# Patient Record
Sex: FEMALE | Race: WHITE | NOT HISPANIC OR LATINO | Employment: FULL TIME | URBAN - METROPOLITAN AREA
[De-identification: names, ages, dates, MRNs, and addresses within clinical notes are randomized per-mention and may not be internally consistent; named-entity substitution may affect disease eponyms.]

---

## 2022-11-30 ENCOUNTER — OFFICE VISIT (OUTPATIENT)
Dept: PSYCHIATRY | Facility: CLINIC | Age: 27
End: 2022-11-30

## 2022-11-30 VITALS
WEIGHT: 173.4 LBS | DIASTOLIC BLOOD PRESSURE: 74 MMHG | HEART RATE: 69 BPM | HEIGHT: 63 IN | BODY MASS INDEX: 30.72 KG/M2 | SYSTOLIC BLOOD PRESSURE: 129 MMHG

## 2022-11-30 DIAGNOSIS — F41.1 GAD (GENERALIZED ANXIETY DISORDER): ICD-10-CM

## 2022-11-30 DIAGNOSIS — F32.81 PMDD (PREMENSTRUAL DYSPHORIC DISORDER): Primary | ICD-10-CM

## 2022-11-30 DIAGNOSIS — E28.2 PCOS (POLYCYSTIC OVARIAN SYNDROME): ICD-10-CM

## 2022-11-30 PROBLEM — Z67.91 RH NEGATIVE STATUS DURING PREGNANCY: Status: ACTIVE | Noted: 2020-09-13

## 2022-11-30 PROBLEM — O09.299 HX OF PREECLAMPSIA, PRIOR PREGNANCY, CURRENTLY PREGNANT: Status: ACTIVE | Noted: 2020-09-11

## 2022-11-30 PROBLEM — O99.340 DEPRESSION AFFECTING PREGNANCY: Status: ACTIVE | Noted: 2021-01-07

## 2022-11-30 PROBLEM — F32.A DEPRESSION AFFECTING PREGNANCY: Status: ACTIVE | Noted: 2021-01-07

## 2022-11-30 PROBLEM — O26.899 RH NEGATIVE STATUS DURING PREGNANCY: Status: ACTIVE | Noted: 2020-09-13

## 2022-11-30 PROBLEM — O34.219 PREVIOUS CESAREAN DELIVERY AFFECTING PREGNANCY: Status: ACTIVE | Noted: 2020-09-11

## 2022-11-30 PROCEDURE — 90792 PSYCH DIAG EVAL W/MED SRVCS: CPT | Performed by: PSYCHIATRY & NEUROLOGY

## 2022-11-30 RX ORDER — MULTIPLE VITAMINS W/ MINERALS TAB 9MG-400MCG
1 TAB ORAL DAILY
COMMUNITY

## 2022-11-30 RX ORDER — IBUPROFEN 800 MG/1
TABLET ORAL
COMMUNITY
End: 2022-11-30

## 2022-11-30 RX ORDER — AMOXICILLIN 875 MG/1
TABLET, COATED ORAL
COMMUNITY
Start: 2022-11-17 | End: 2022-11-30

## 2022-11-30 RX ORDER — BIOTIN 10 MG
TABLET ORAL
COMMUNITY
End: 2022-11-30

## 2022-11-30 RX ORDER — BUPROPION HYDROCHLORIDE 150 MG/1
150 TABLET ORAL EVERY MORNING
Qty: 30 TABLET | Refills: 0 | Status: SHIPPED | OUTPATIENT
Start: 2022-11-30 | End: 2022-12-28 | Stop reason: SDUPTHER

## 2022-11-30 RX ORDER — CETIRIZINE HYDROCHLORIDE 10 MG/1
10 TABLET ORAL DAILY
COMMUNITY
Start: 2022-11-16 | End: 2023-01-09

## 2022-11-30 RX ORDER — BACLOFEN 20 MG/1
TABLET ORAL
COMMUNITY
End: 2022-11-30

## 2022-11-30 RX ORDER — MAGNESIUM OXIDE 400 MG/1
400 TABLET ORAL DAILY
COMMUNITY

## 2022-11-30 RX ORDER — SPIRONOLACTONE 50 MG/1
50 TABLET, FILM COATED ORAL DAILY
COMMUNITY
Start: 2022-11-16

## 2022-11-30 RX ORDER — VITAMIN A ACETATE, BETA CAROTENE, ASCORBIC ACID, CHOLECALCIFEROL, .ALPHA.-TOCOPHEROL ACETATE, DL-, THIAMINE MONONITRATE, RIBOFLAVIN, NIACINAMIDE, PYRIDOXINE HYDROCHLORIDE, FOLIC ACID, CYANOCOBALAMIN, CALCIUM CARBONATE, FERROUS FUMARATE, ZINC OXIDE, CUPRIC OXIDE 3080; 12; 120; 400; 1; 1.84; 3; 20; 22; 920; 25; 200; 27; 10; 2 [IU]/1; UG/1; MG/1; [IU]/1; MG/1; MG/1; MG/1; MG/1; MG/1; [IU]/1; MG/1; MG/1; MG/1; MG/1; MG/1
1 TABLET, FILM COATED ORAL DAILY
COMMUNITY
End: 2022-11-30
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
## 2022-11-30 NOTE — TREATMENT PLAN
Multi-Disciplinary Problems (from Behavioral Health Treatment Plan)    Active Problems     Problem: Anxiety  Start Date: 11/30/22    Problem Details: The patient self-scales this problem as a 5 with 10 being the worst.        Goal Priority Start Date Expected End Date End Date    Patient will develop and implement behavioral and cognitive strategies to reduce anxiety and irrational fears. -- 11/30/22 -- --    Goal Details: Progress toward goal:  Not appropriate to rate progress toward goal since this is the initial treatment plan.        Goal Intervention Frequency Start Date End Date    Help patient explore past emotional issues in relation to present anxiety. PRN 11/30/22 --    Intervention Details: Duration of treatment until until remission of symptoms.        Goal Intervention Frequency Start Date End Date    Help patient develop an awareness of their cognitive and physical responses to anxiety. PRN 11/30/22 --    Intervention Details: Duration of treatment until until remission of symptoms.                           I have discussed and reviewed this treatment plan with the patient.

## 2022-12-28 DIAGNOSIS — F32.81 PMDD (PREMENSTRUAL DYSPHORIC DISORDER): ICD-10-CM

## 2022-12-28 RX ORDER — BUPROPION HYDROCHLORIDE 150 MG/1
150 TABLET ORAL EVERY MORNING
Qty: 30 TABLET | Refills: 0 | Status: SHIPPED | OUTPATIENT
Start: 2022-12-28 | End: 2023-01-09 | Stop reason: SDUPTHER

## 2022-12-28 NOTE — TELEPHONE ENCOUNTER
Pt called medication refill request for Wellbutrin XL 150mg and reported she only has about 2 days-worth left.     buPROPion XL (Wellbutrin XL) 150 MG 24 hr tablet (11/30/2022)    Pt has upcoming appt on 01/09/2023.     Medication order pended.

## 2023-01-09 ENCOUNTER — OFFICE VISIT (OUTPATIENT)
Dept: PSYCHIATRY | Facility: CLINIC | Age: 28
End: 2023-01-09
Payer: COMMERCIAL

## 2023-01-09 VITALS
DIASTOLIC BLOOD PRESSURE: 82 MMHG | HEIGHT: 63 IN | WEIGHT: 169 LBS | BODY MASS INDEX: 29.95 KG/M2 | SYSTOLIC BLOOD PRESSURE: 139 MMHG

## 2023-01-09 DIAGNOSIS — F41.1 GAD (GENERALIZED ANXIETY DISORDER): ICD-10-CM

## 2023-01-09 DIAGNOSIS — E28.2 PCOS (POLYCYSTIC OVARIAN SYNDROME): ICD-10-CM

## 2023-01-09 DIAGNOSIS — F32.81 PMDD (PREMENSTRUAL DYSPHORIC DISORDER): Primary | ICD-10-CM

## 2023-01-09 PROCEDURE — 99214 OFFICE O/P EST MOD 30 MIN: CPT | Performed by: PSYCHIATRY & NEUROLOGY

## 2023-01-09 RX ORDER — CLONAZEPAM 1 MG/1
1 TABLET ORAL DAILY PRN
Qty: 7 TABLET | Refills: 0 | Status: SHIPPED | OUTPATIENT
Start: 2023-01-09 | End: 2023-02-08

## 2023-01-09 RX ORDER — BUPROPION HYDROCHLORIDE 150 MG/1
150 TABLET ORAL EVERY MORNING
Qty: 30 TABLET | Refills: 0 | Status: SHIPPED | OUTPATIENT
Start: 2023-01-09 | End: 2023-02-08 | Stop reason: SDUPTHER

## 2023-01-09 NOTE — PROGRESS NOTES
[facility]  Follow-up Visit    Chief Complaint: PMDD    History of Present Illness: Luiza Oneill is a 27 y.o. female who presents today for follow-up regarding PMDD and MARA in the setting of PCOS. Last seen: 11/30 at which time bupropion was started. She presents accompanied by her sons in no acute distress and engages with me appropriately. Psychotropic regimen perceived to be effective. Side-effects per given history: denies.    Current treatment regimen includes:   - bupropion  mg QD    Today the patient feels that her mood overall is better. Bupropion has helped improve fatigue, mood, and intrusive thoughts related to premenstrual period. However, anxiety and agitation still remain high during premenstrual period. Thought process and content are devoid of overt aberration suggestive of acute raul/psychosis. The patient denies SI/HI/AVH. There are no overt changes on exam today compared to most recent evaluation.  - contextual changes: holidays, weather stressors - had to miss out on routine  - sleep: no change outside of diminished daytime somnolence  - appetite: tolerably diminished    I have counseled the patient with regard to diagnoses and the recommended treatment regimen as documented below: I will be prescribing rescue clonazepam for anxiety/agitation in the premenstrual period. Patient has been advised that long-term use of this agent can foster tachyphylaxis, dependence, and severe/life-threatening withdrawal with abrupt discontinuation - this agent will be used sparingly and as a rescue during periods of severe anxiety to augment more regular treatment options. I have specifically reviewed issues related to misuse and diversion with the patient today. Patient acknowledges the diagnoses per my rendered interpretation. Patient demonstrates understanding of potential risks/benefits/side effects associated with this regimen and is amenable to proceed in  this fashion.     Psychotherapy  - Time: 12 minutes  - interventions employed: the therapeutic alliance was strengthened to encourage the patient to express their thoughts and feelings freely. Esteem building was enhanced through praise, reassurance, normalizing/challenging, and encouragement as appropriate. Coping skills were enhanced to build distress tolerance skills and emotional regulation. Allowed patient to freely discuss issues without interruption or judgement with unconditional positive regard, active listening skills, and empathy. Provided a safe, confidential environment to facilitate the development of a positive therapeutic relationship and encourage open, honest communication. Assisted patient in processing session content; acknowledged and normalized/addressed, as appropriate, patient’s thoughts, feelings, and concerns by utilizing a person-centered approach in efforts to build appropriate rapport and a positive therapeutic relationship with open and honest communication.   - Diagnoses: see assessment and plan below  - Symptoms: see subjective above  - Goals   - patient: mitigate anxiety and irritability, sustain improvements to mood   - provider: challenge patterns of living conducive to pathology, strengthen defenses, promote problems solving, restore adaptive functioning and provide symptom relief.  - Treatment plan: continue supportive psychotherapy in subsequent appointments to provide symptom relief; see assessment and plan below for additional details:   - iteration: 1   - progress: partial   - (X)illumination, (X)contextualization, (X)detection, (working)development, (-)elaboration, (-)refinement  - functional status: fair  - mental status exam: as below  - prognosis: good    Psychiatric History:  - Pertinent interval changes: N/A  - Psychotropic medication trials: sertraline after her last pregnancy (helped with some anxiety, but felt estranged from herself, disconnected, had significant  sexual SE)  - Key synopsis: cutting as a teen, short-lived - \"more for attention\"; no hx SA    Substance Abuse History:   - Pertinent interval changes: N/A  - Key synopsis: opioids \"were a phase around 17\", quit smoking marijuana in     Social History:  - Pertinent interval changes: as above  - Key synopsis: lives in a house with her fiance and 4 children (1.4 YO, 3 YO, 4 YO, 5 YO); LPN; 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[employer]; [employer], occasionally goes to [employer]; HI[employer], yoga; recently decreased (was drinking a pot of coffee in the past)    Social History     Socioeconomic History   • Marital status: Single   Tobacco Use   • Smoking status: Former     Packs/day: 1.00     Years: 12.00     Pack years: 12.00     Types: Cigarettes     Quit date: 2022     Years since quittin.6   • Tobacco comments:     Quit cold turkey 6 months ago   Vaping Use   • Vaping Use: Former   • Substances: Nicotine, THC, Flavoring   Substance and Sexual Activity   • Alcohol use: Not Currently     Comment: socailly   • Drug use: Not Currently     Types: Marijuana, Oxycodone     Comment: On and off for years. Quit 6 months ago   • Sexual activity: Yes     Partners: Male     Birth control/protection: None, Tubal ligation     Tobacco use counseling/intervention: N/A, patient does not use tobacco; patient has been counseled with regard to risks of tobacco use.    PHQ-9 Depression Screening  PHQ-9 Total Score:      Little interest or pleasure in doing things?     Feeling down, depressed, or hopeless?     Trouble falling or staying asleep, or sleeping too much?     Feeling tired or having little energy?     Poor appetite or overeating?     Feeling bad about yourself - or that you are a failure or have let yourself or your family down?     Trouble concentrating on things, such as reading the newspaper or watching television?     Moving or speaking so slowly that other people could have noticed? Or the opposite - being so fidgety or restless that you have been  moving around a lot more than usual?     Thoughts that you would be better off dead, or of hurting yourself in some way?     PHQ-9 Total Score       Change in PHQ-9 since last measure: N/A (1)    MARA-7       Change in MARA-7 since last measure: N/A (11)    Problem List:  Patient Active Problem List   Diagnosis   • Depression affecting pregnancy   • Hx of preeclampsia, prior pregnancy, currently pregnant   • Previous  delivery affecting pregnancy   • Rh negative status during pregnancy   • PCOS (polycystic ovarian syndrome)   • MARA (generalized anxiety disorder)   • PMDD (premenstrual dysphoric disorder)     Allergy:   Allergies   Allergen Reactions   • Sulfa Antibiotics Other (See Comments)   • Sulfacetamide Sodium-Sulfur Hives        Discontinued Medications:  Medications Discontinued During This Encounter   Medication Reason   • cetirizine (zyrTEC) 10 MG tablet *Therapy completed   • Unable to find *Therapy completed   • buPROPion XL (Wellbutrin XL) 150 MG 24 hr tablet Reorder       Current Medications:   Current Outpatient Medications   Medication Sig Dispense Refill   • buPROPion XL (Wellbutrin XL) 150 MG 24 hr tablet Take 1 tablet by mouth Every Morning for 30 days. 30 tablet 0   • magnesium oxide (MAG-OX) 400 MG tablet Take 400 mg by mouth Daily.     • multivitamin with minerals tablet tablet Take 1 tablet by mouth Daily.     • Probiotic Product (ACIDOPHILUS PROBIOTIC BLEND PO) Acidophilus Probiotic Blend     • spironolactone (ALDACTONE) 50 MG tablet Take 50 mg by mouth Daily.     • clonazePAM (KlonoPIN) 1 MG tablet Take 1 tablet by mouth Daily As Needed for Anxiety (use during premenstrual period only, use sparingly, no early refills) for up to 7 doses. 7 tablet 0     No current facility-administered medications for this visit.     Past Medical History:  Past Medical History:   Diagnosis Date   • Anxiety    • Depression    • Depression affecting pregnancy 2021   • Panic disorder    • Self-injurious  behavior     As a teenager   • Substance abuse (HCC)      Past Surgical History:  Past Surgical History:   Procedure Laterality Date   • ABDOMINAL SURGERY      4x c sections   • POSTPARTUM TUBAL LIGATION         Mental Status Exam:   Appearance: well-groomed, sits upright, age-appropriate, normal habitus  Behavior: calm, cooperative, appropriate in demeanor, appropriate eye-contact  Mood/affect: euthymic / mood-congruent and appropriate in both range and amplitude  Speech: within expected variance; appropriate rate, appropriate rhythm, appropriate tone; non-pressured  Thought Process: linear, goal-directed; no FOI or ASTER; abstraction intact  Thought Content: coherent, devoid of overt delusions/perceptual disturbances  SI/HI: denies both SI and HI; exhibits future-orientation, self-advocates appropriately, no regular self-harm, no appreciable intent  Memory: no overt deficits  Orientation: oriented to person/place/time/situation  Concentration: appropriate during interview  Intellectual capacity: presumptively average  Insight: fair by given history/exam  Judgment: appropriate by given history/exam  Psychomotor: no appreciable latency/retardation/agitation/tremor  Gait: WNL    Review of Systems:  Review of Systems   Constitutional: Positive for appetite change. Negative for activity change and unexpected weight change.   HENT: Negative for drooling.    Eyes: Negative for visual disturbance.   Respiratory: Negative for chest tightness and shortness of breath.    Cardiovascular: Negative for chest pain and palpitations.   Gastrointestinal: Negative for abdominal pain, diarrhea and nausea.   Endocrine: Negative for cold intolerance and heat intolerance.   Genitourinary: Negative for difficulty urinating and frequency.   Musculoskeletal: Negative for myalgias and neck stiffness.   Skin: Negative for rash.   Neurological: Negative for dizziness, tremors, seizures and light-headedness.     Vital Signs:   /82   Ht  160 cm (63\")   Wt 76.7 kg (169 lb)   BMI 29.94 kg/m²      Lab Results:   No visits with results within 6 Month(s) from this visit.   Latest known visit with results is:   No results found for any previous visit.     EKG Results:  No orders to display     Imaging Results:  No Images in the past 120 days found.    ASSESSMENT AND PLAN:    ICD-10-CM ICD-9-CM   1. PMDD (premenstrual dysphoric disorder)  F32.81 625.4   2. MARA (generalized anxiety disorder)  F41.1 300.02   3. PCOS (polycystic ovarian syndrome)  E28.2 256.4     27 y.o. female who presents today for follow-up regarding PMDD, MARA in the context of PCOS. We have discussed the interval history and the treatment plan below, including potential R/B/SE of the recommended regimen of which the patient demonstrates understanding. Patient is agreeable to call 911 or go to the nearest ER should she become concerned for her own safety and/or the safety of those around her. There are no medication side effects or related complaints today. There are no overt indices of acute raul/psychosis on exam today. ANTHONY reviewed and as expected.    1. Medication regimen: begin clonazepam 1 mg QD PRN anxiety/agitation in the context of PMDD (#7 monthly), continue bupropion; patient is advised not to misuse prescribed medications or to use them with any exogenous substances that aren't disclosed to this provider as they may interact with the regimen to the patient's detriment.   2. Risk Assessment: protracted risk is low, imminent risk is low - no interval change. Do note that this is subject to change with the Yazdanism of new stressors, treatment non-adherence, use of substances, and/or new medical ails.   3. Monitoring: N/A  4. Therapy: defer  5. Follow-up: 1 month  6. Communications: N/A    TREATMENT PLAN/GOALS: challenge patterns of living conducive to symptom burden, change recommended regimen as above with augmentative, intermittent supportive psychotherapy to reduce symptom  burden. Patient acknowledged and verbally consented to continue treatment. The importance of adherence to the recommended treatment and interval follow-up appointments was again emphasized today: patient has good treatment adherence per given history. Patient was today reminded to limit daily caffeine intake, hydrate appropriately, eat healthy and nutritious foods, engage sleep hygiene measures, engage appropriate exposure to sunlight, engage with hobbies in balance with life necessities, and exercise appropriate to their capacity to do so.     Billing: This encounter is of moderate complexity based on number/complexity of problems addressed today and risk of complications/morbidity: 2+ stable chronic illnesses and prescription management.     Parts of this note are electronic transcriptions/translations of spoken language to printed text using the Dragon Dictation system.    Electronically signed by Raul Zambrano MD, 01/09/23, [id number]

## 2023-02-08 ENCOUNTER — OFFICE VISIT (OUTPATIENT)
Dept: PSYCHIATRY | Facility: CLINIC | Age: 28
End: 2023-02-08
Payer: COMMERCIAL

## 2023-02-08 VITALS
BODY MASS INDEX: 29.48 KG/M2 | WEIGHT: 166.4 LBS | HEART RATE: 72 BPM | SYSTOLIC BLOOD PRESSURE: 128 MMHG | HEIGHT: 63 IN | DIASTOLIC BLOOD PRESSURE: 78 MMHG

## 2023-02-08 DIAGNOSIS — F32.81 PMDD (PREMENSTRUAL DYSPHORIC DISORDER): ICD-10-CM

## 2023-02-08 DIAGNOSIS — E28.2 PCOS (POLYCYSTIC OVARIAN SYNDROME): ICD-10-CM

## 2023-02-08 DIAGNOSIS — F41.1 GAD (GENERALIZED ANXIETY DISORDER): Primary | ICD-10-CM

## 2023-02-08 DIAGNOSIS — F33.42 DEPRESSION, MAJOR, RECURRENT, IN COMPLETE REMISSION: ICD-10-CM

## 2023-02-08 PROCEDURE — 90833 PSYTX W PT W E/M 30 MIN: CPT | Performed by: PSYCHIATRY & NEUROLOGY

## 2023-02-08 PROCEDURE — 99214 OFFICE O/P EST MOD 30 MIN: CPT | Performed by: PSYCHIATRY & NEUROLOGY

## 2023-02-08 RX ORDER — BUPROPION HYDROCHLORIDE 150 MG/1
150 TABLET ORAL EVERY MORNING
Qty: 30 TABLET | Refills: 0 | Status: SHIPPED | OUTPATIENT
Start: 2023-02-08 | End: 2023-03-16 | Stop reason: SDUPTHER

## 2023-02-08 RX ORDER — BUSPIRONE HYDROCHLORIDE 5 MG/1
5 TABLET ORAL 2 TIMES DAILY
Qty: 60 TABLET | Refills: 0 | Status: SHIPPED | OUTPATIENT
Start: 2023-02-08 | End: 2023-03-16 | Stop reason: SDUPTHER

## 2023-02-08 RX ORDER — DIAZEPAM 5 MG/1
5 TABLET ORAL DAILY
Qty: 7 TABLET | Refills: 0 | Status: SHIPPED | OUTPATIENT
Start: 2023-02-08 | End: 2023-03-16 | Stop reason: SDUPTHER

## 2023-02-08 RX ORDER — MELATONIN
1000 DAILY
COMMUNITY

## 2023-02-08 NOTE — PROGRESS NOTES
"[facility]  Follow-up Visit    Chief Complaint: PMDD    History of Present Illness: Luiza Oneill is a 27 y.o. female who presents today for follow-up regarding PMDD and MARA in the setting of PCOS. Last seen: 01/09 at which time clonazepam was started for the dysphoria/anxiety in the setting of PMS. She presents unaccompanied and in no acute distress and engages with me appropriately. Psychotropic regimen perceived to be effective. Side-effects per given history: intermittent insomnia, increased irritability/anxiety.    Current treatment regimen includes:   - bupropion  mg QD  - clonazepam 1 mg QD PRN (#7 monthly)    Today the patient feels that her mood overall is better, but that maybe her anxiety is a bit worse related to overstimulation and irritability with noise. She'd like to begin a serotonergic agent for anxiety. Bupropion has helped improve fatigue, mood, and intrusive thoughts related to premenstrual period. Clonazepam has been helpful, but effects were not sufficiently sustained. Thought process and content are devoid of overt aberration suggestive of acute raul/psychosis. The patient denies SI/HI/AVH. There are no overt changes on exam today compared to most recent evaluation.  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 I have advised this medication is not to be taken PRN as it is commonly prescribed, but needs to be taken multiple times daily " consistently for up to 4 weeks in order to convey effect. I have advised for potential SE of dizziness and sedation. Patient acknowledges the diagnoses per my rendered interpretation. Patient demonstrates understanding of potential risks/benefits/side effects associated with this regimen and is amenable to proceed in this fashion.     Psychotherapy  - Time: 20 minutes  - interventions employed: the therapeutic alliance was strengthened to encourage the patient to express their thoughts and feelings freely. Esteem building was enhanced through praise, reassurance, normalizing/challenging, and encouragement as appropriate. Coping skills were enhanced to build distress tolerance skills and emotional regulation. Allowed patient to freely discuss issues without interruption or judgement with unconditional positive regard, active listening skills, and empathy. Provided a safe, confidential environment to facilitate the development of a positive therapeutic relationship and encourage open, honest communication. Assisted patient in processing session content; acknowledged and normalized/addressed, as appropriate, patient’s thoughts, feelings, and concerns by utilizing a person-centered approach in efforts to build appropriate rapport and a positive therapeutic relationship with open and honest communication.   - Diagnoses: see assessment and plan below  - Symptoms: see subjective above  - Goals   - patient: mitigate anxiety and irritability, sustain improvements to mood   - provider: challenge patterns of living conducive to pathology, strengthen defenses, promote problems solving, restore adaptive functioning and provide symptom relief.  - Treatment plan: continue supportive psychotherapy in subsequent appointments to provide symptom relief; see assessment and plan below for additional details:   - iteration: 1   - progress: partial   - (X)illumination, (X)contextualization, (X)detection, (working)development,  "(-)elaboration, (-)refinement  - functional status: fair  - mental status exam: as below  - prognosis: good    Psychiatric History:  - Pertinent interval changes: N/A  - Psychotropic medication trials: sertraline after her last pregnancy (helped with some anxiety, but felt estranged from herself, disconnected, had significant sexual SE)  - Key synopsis: cutting as a teen, short-lived - \"more for attention\"; no hx SA    Substance Abuse History:   - Pertinent interval changes: N/A  - Key synopsis: opioids \"were a phase around 17\", quit smoking marijuana in     Social History:  - Pertinent interval changes: as above  - Key synopsis: lives in a house with her fiance and 4 children (1.6 YO, 3 YO, 6 YO, 5 YO); LPN; 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[employer]; Worship, occasionally goes to [employer]; HIIT, yoga; recently decreased (was drinking a pot of coffee in the past)    Social History     Socioeconomic History   • Marital status: Single   Tobacco Use   • Smoking status: Former     Packs/day: 1.00     Years: 12.00     Pack years: 12.00     Types: Cigarettes     Quit date: 2022     Years since quittin.6   • Tobacco comments:     Quit cold turkey 6 months ago   Vaping Use   • Vaping Use: Former   • Substances: Nicotine, THC, Flavoring   Substance and Sexual Activity   • Alcohol use: Not Currently     Comment: socailly   • Drug use: Not Currently     Types: Marijuana, Oxycodone     Comment: On and off for years. Quit 6 months ago   • Sexual activity: Yes     Partners: Male     Birth control/protection: None, Tubal ligation     Tobacco use counseling/intervention: N/A, patient does not use tobacco; patient has been counseled with regard to risks of tobacco use.    PHQ-9 Depression Screening  PHQ-9 Total Score:      Little interest or pleasure in doing things?     Feeling down, depressed, or hopeless?     Trouble falling or staying asleep, or sleeping too much?     Feeling tired or having little energy?     Poor appetite or overeating?   "   Feeling bad about yourself - or that you are a failure or have let yourself or your family down?     Trouble concentrating on things, such as reading the newspaper or watching television?     Moving or speaking so slowly that other people could have noticed? Or the opposite - being so fidgety or restless that you have been moving around a lot more than usual?     Thoughts that you would be better off dead, or of hurting yourself in some way?     PHQ-9 Total Score       Change in PHQ-9 since last measure: N/A (1)    MARA-7       Change in MARA-7 since last measure: N/A (11)    Problem List:  Patient Active Problem List   Diagnosis   • Depression affecting pregnancy   • Hx of preeclampsia, prior pregnancy, currently pregnant   • Previous  delivery affecting pregnancy   • Rh negative status during pregnancy   • PCOS (polycystic ovarian syndrome)   • MARA (generalized anxiety disorder)   • PMDD (premenstrual dysphoric disorder)   • Depression, major, recurrent, in complete remission (HCC)     Allergy:   Allergies   Allergen Reactions   • Sulfa Antibiotics Other (See Comments)   • Sulfacetamide Sodium-Sulfur Hives        Discontinued Medications:  Medications Discontinued During This Encounter   Medication Reason   • clonazePAM (KlonoPIN) 1 MG tablet        Current Medications:   Current Outpatient Medications   Medication Sig Dispense Refill   • buPROPion XL (Wellbutrin XL) 150 MG 24 hr tablet Take 1 tablet by mouth Every Morning for 30 days. 30 tablet 0   • cholecalciferol (VITAMIN D3) 25 MCG (1000 UT) tablet Take 1,000 Units by mouth Daily.     • magnesium oxide (MAG-OX) 400 MG tablet Take 400 mg by mouth Daily.     • multivitamin with minerals tablet tablet Take 1 tablet by mouth Daily.     • Probiotic Product (ACIDOPHILUS PROBIOTIC BLEND PO) Acidophilus Probiotic Blend     • spironolactone (ALDACTONE) 50 MG tablet Take 50 mg by mouth Daily.       No current facility-administered medications for this visit.      Past Medical History:  Past Medical History:   Diagnosis Date   • Anxiety    • Depression    • Depression affecting pregnancy 1/7/2021   • Panic disorder    • Self-injurious behavior     As a teenager   • Substance abuse (HCC)      Past Surgical History:  Past Surgical History:   Procedure Laterality Date   • ABDOMINAL SURGERY      4x c sections   • POSTPARTUM TUBAL LIGATION         Mental Status Exam:   Appearance: well-groomed, sits upright, age-appropriate, normal habitus  Behavior: calm, cooperative, appropriate in demeanor, appropriate eye-contact  Mood/affect: euthymic / mood-congruent and appropriate in both range and amplitude  Speech: within expected variance; appropriate rate, appropriate rhythm, appropriate tone; non-pressured  Thought Process: linear, goal-directed; no FOI or ASTER; abstraction intact  Thought Content: coherent, devoid of overt delusions/perceptual disturbances  SI/HI: denies both SI and HI; exhibits future-orientation, self-advocates appropriately, no regular self-harm, no appreciable intent  Memory: no overt deficits  Orientation: oriented to person/place/time/situation  Concentration: appropriate during interview  Intellectual capacity: presumptively average  Insight: fair by given history/exam  Judgment: appropriate by given history/exam  Psychomotor: no appreciable latency/retardation/agitation/tremor  Gait: WNL    Review of Systems:  Review of Systems   Constitutional: Negative for activity change, appetite change and unexpected weight change.   HENT: Negative for drooling.    Eyes: Negative for visual disturbance.   Respiratory: Negative for chest tightness and shortness of breath.    Cardiovascular: Negative for chest pain and palpitations.   Gastrointestinal: Negative for abdominal pain, constipation, diarrhea, nausea and vomiting.   Endocrine: Negative for cold intolerance and heat intolerance.   Genitourinary: Negative for difficulty urinating and frequency.  "  Musculoskeletal: Negative for myalgias and neck stiffness.   Skin: Negative for rash.   Neurological: Negative for dizziness, tremors, seizures and light-headedness.     Vital Signs:   /78   Pulse 72   Ht 160 cm (63\")   Wt 75.5 kg (166 lb 6.4 oz)   BMI 29.48 kg/m²      Lab Results:   No visits with results within 6 Month(s) from this visit.   Latest known visit with results is:   No results found for any previous visit.     EKG Results:  No orders to display     Imaging Results:  No Images in the past 120 days found.    ASSESSMENT AND PLAN:    ICD-10-CM ICD-9-CM   1. MARA (generalized anxiety disorder)  F41.1 300.02   2. Depression, major, recurrent, in complete remission (HCC)  F33.42 296.36   3. PMDD (premenstrual dysphoric disorder)  F32.81 625.4   4. PCOS (polycystic ovarian syndrome)  E28.2 256.4     27 y.o. female who presents today for follow-up regarding PMDD, MARA in the context of PCOS. We have discussed the interval history and the treatment plan below, including potential R/B/SE of the recommended regimen of which the patient demonstrates understanding. Patient is agreeable to call 911 or go to the nearest ER should she become concerned for her own safety and/or the safety of those around her. There are no medication side effects or related complaints today. There are no overt indices of acute raul/psychosis on exam today. ANTHONY reviewed and as expected.    1. Medication regimen: begin buspirone 5 mg BID, replace clonazepam with diazepam 5 mg QD PRN anxiety/agitation in the context of PMDD (#7 monthly), continue bupropion; patient is advised not to misuse prescribed medications or to use them with any exogenous substances that aren't disclosed to this provider as they may interact with the regimen to the patient's detriment.   2. Risk Assessment: protracted risk is low, imminent risk is low - no interval change. Do note that this is subject to change with the [employer] of new stressors, " treatment non-adherence, use of substances, and/or new medical ails.   3. Monitoring: N/A  4. Therapy: defer  5. Follow-up: 1 month  6. Communications: N/A    TREATMENT PLAN/GOALS: challenge patterns of living conducive to symptom burden, implement recommended regimen as above with augmentative, intermittent supportive psychotherapy to reduce symptom burden. Patient acknowledged and verbally consented to continue treatment. The importance of adherence to the recommended treatment and interval follow-up appointments was again emphasized today: patient has good treatment adherence per given history. Patient was today reminded to limit daily caffeine intake, hydrate appropriately, eat healthy and nutritious foods, engage sleep hygiene measures, engage appropriate exposure to sunlight, engage with hobbies in balance with life necessities, and exercise appropriate to their capacity to do so.     Billing: This encounter is of moderate complexity based on number/complexity of problems addressed today and risk of complications/morbidity: 2+ stable chronic illnesses and prescription management. Additionally, I provided 20 minutes of dedicated psychotherapy to the patient as documented above.     Parts of this note are electronic transcriptions/translations of spoken language to printed text using the Dragon Dictation system.    Electronically signed by Raul Zambrano MD, 02/08/23, 5524

## 2023-03-16 ENCOUNTER — OFFICE VISIT (OUTPATIENT)
Dept: PSYCHIATRY | Facility: CLINIC | Age: 28
End: 2023-03-16
Payer: COMMERCIAL

## 2023-03-16 VITALS
WEIGHT: 161.2 LBS | DIASTOLIC BLOOD PRESSURE: 64 MMHG | HEART RATE: 73 BPM | HEIGHT: 63 IN | SYSTOLIC BLOOD PRESSURE: 138 MMHG | BODY MASS INDEX: 28.56 KG/M2

## 2023-03-16 DIAGNOSIS — F41.1 GAD (GENERALIZED ANXIETY DISORDER): Primary | ICD-10-CM

## 2023-03-16 DIAGNOSIS — F32.81 PMDD (PREMENSTRUAL DYSPHORIC DISORDER): ICD-10-CM

## 2023-03-16 DIAGNOSIS — F33.42 DEPRESSION, MAJOR, RECURRENT, IN COMPLETE REMISSION: ICD-10-CM

## 2023-03-16 PROCEDURE — 99214 OFFICE O/P EST MOD 30 MIN: CPT | Performed by: PSYCHIATRY & NEUROLOGY

## 2023-03-16 RX ORDER — DIAZEPAM 5 MG/1
5 TABLET ORAL DAILY
Qty: 7 TABLET | Refills: 1 | Status: SHIPPED | OUTPATIENT
Start: 2023-03-16

## 2023-03-16 RX ORDER — BUSPIRONE HYDROCHLORIDE 10 MG/1
10 TABLET ORAL 2 TIMES DAILY
Qty: 120 TABLET | Refills: 0 | Status: SHIPPED | OUTPATIENT
Start: 2023-03-16

## 2023-03-16 RX ORDER — BUPROPION HYDROCHLORIDE 150 MG/1
150 TABLET ORAL EVERY MORNING
Qty: 60 TABLET | Refills: 0 | Status: SHIPPED | OUTPATIENT
Start: 2023-03-16

## 2023-03-16 NOTE — PROGRESS NOTES
[facility]  Follow-up Visit    Chief Complaint: PMDD    History of Present Illness: Luiza Oneill is a 27 y.o. female who presents today for follow-up regarding PMDD and MARA in the setting of PCOS. Last seen: 02/08 at which time rescue clonazepam was replaced with rescue diazepam and buspirone was started. She presents unaccompanied and in no acute distress and engages with me appropriately. Psychotropic regimen perceived to be effective. Side-effects per given history: intermittent insomnia, increased irritability/anxiety.    Current treatment regimen includes:   - bupropion  mg QD  - buspirone 5 mg BID  - diazepam 1 mg QD PRN (#7 monthly for use during premenstrual period)    Today the patient feels that her mood overall is fairly good. She does still struggle at times with noise overstimulation, but this is more manageable than it had been. Anxiety is some better with buspirone. Diazepam is reported to be more effective and tolerable than was clonazepam. Thought process and content are devoid of overt aberration suggestive of acute raul/psychosis. The patient denies SI/HI/AVH. There are no overt changes on exam today compared to most recent evaluation.  - contextual changes: did well in her competitions - she won at both; plans to visit [place] (no plan in terms of dates yet) and do a mining date after which they'll turn that jewelry into wedding bands  - sleep: improved, does have trouble with this upon starting her menstrual period (tolerable, transient)  - appetite: adequate, improved outside of the first days of her menstrual cycle    I have counseled the patient with regard to diagnoses and the recommended treatment regimen as documented below. Patient acknowledges the diagnoses per my rendered interpretation. Patient demonstrates understanding of potential risks/benefits/side effects associated with this regimen and is amenable to proceed in this fashion.      Psychotherapy  - Time: 12 minutes  - interventions employed: the therapeutic alliance was strengthened to encourage the patient to express their thoughts and feelings freely. Esteem building was enhanced through praise, reassurance, normalizing/challenging, and encouragement as appropriate. Coping skills were enhanced to build distress tolerance skills and emotional regulation. Allowed patient to freely discuss issues without interruption or judgement with unconditional positive regard, active listening skills, and empathy. Provided a safe, confidential environment to facilitate the development of a positive therapeutic relationship and encourage open, honest communication. Assisted patient in processing session content; acknowledged and normalized/addressed, as appropriate, patient’s thoughts, feelings, and concerns by utilizing a person-centered approach in efforts to build appropriate rapport and a positive therapeutic relationship with open and honest communication.   - Diagnoses: see assessment and plan below  - Symptoms: see subjective above  - Goals   - patient: mitigate anxiety and irritability, sustain improvements to mood   - provider: challenge patterns of living conducive to pathology, strengthen defenses, promote problems solving, restore adaptive functioning and provide symptom relief.  - Treatment plan: continue supportive psychotherapy in subsequent appointments to provide symptom relief; see assessment and plan below for additional details:   - iteration: 1   - progress: partial   - (X)illumination, (X)contextualization, (X)detection, (working)development, (-)elaboration, (-)refinement  - functional status: fair  - mental status exam: as below  - prognosis: good    Psychiatric History:  - Pertinent interval changes: N/A  - Psychotropic medication trials: sertraline after her last pregnancy (helped with some anxiety, but felt estranged from herself, disconnected, had significant sexual SE)  - Key  "synopsis: cutting as a teen, short-lived - \"more for attention\"; no hx SA    Substance Abuse History:   - Pertinent interval changes: N/A  - Key synopsis: opioids \"were a phase around 17\", quit smoking marijuana in     Social History:  - Pertinent interval changes: as above  - Key synopsis: lives in a house with her fiance and 4 children (1.6 YO, 3 YO, 6 YO, 7 YO); LPN; trains jiu jitsu; [employer], occasionally goes to [employer]; HIIT, yoga; recently decreased (was drinking a pot of coffee in the past)    Social History     Socioeconomic History   • Marital status: Single   Tobacco Use   • Smoking status: Former     Packs/day: 1.00     Years: 12.00     Pack years: 12.00     Types: Cigarettes     Quit date: 2022     Years since quittin.7   • Tobacco comments:     Quit cold turkey 6 months ago   Vaping Use   • Vaping Use: Former   • Substances: Nicotine, THC, Flavoring   Substance and Sexual Activity   • Alcohol use: Not Currently     Comment: socailly   • Drug use: Not Currently     Types: Marijuana, Oxycodone     Comment: On and off for years. Quit 6 months ago   • Sexual activity: Yes     Partners: Male     Birth control/protection: None, Tubal ligation     Tobacco use counseling/intervention: N/A, patient does not use tobacco; patient has been counseled with regard to risks of tobacco use.    PHQ-9 Depression Screening  PHQ-9 Total Score:      Little interest or pleasure in doing things?     Feeling down, depressed, or hopeless?     Trouble falling or staying asleep, or sleeping too much?     Feeling tired or having little energy?     Poor appetite or overeating?     Feeling bad about yourself - or that you are a failure or have let yourself or your family down?     Trouble concentrating on things, such as reading the newspaper or watching television?     Moving or speaking so slowly that other people could have noticed? Or the opposite - being so fidgety or restless that you have been moving around a lot " more than usual?     Thoughts that you would be better off dead, or of hurting yourself in some way?     PHQ-9 Total Score       Change in PHQ-9 since last measure: N/A (1)    MARA-7       Change in MARA-7 since last measure: N/A (11)    Problem List:  Patient Active Problem List   Diagnosis   • Depression affecting pregnancy   • Hx of preeclampsia, prior pregnancy, currently pregnant   • Previous  delivery affecting pregnancy   • Rh negative status during pregnancy   • PCOS (polycystic ovarian syndrome)   • MARA (generalized anxiety disorder)   • PMDD (premenstrual dysphoric disorder)   • Depression, major, recurrent, in complete remission (HCC)     Allergy:   Allergies   Allergen Reactions   • Sulfa Antibiotics Other (See Comments)   • Sulfacetamide Sodium-Sulfur Hives      Discontinued Medications:  Medications Discontinued During This Encounter   Medication Reason   • buPROPion XL (Wellbutrin XL) 150 MG 24 hr tablet Reorder   • busPIRone (BUSPAR) 5 MG tablet Reorder   • diazePAM (VALIUM) 5 MG tablet Reorder     Current Medications:   Current Outpatient Medications   Medication Sig Dispense Refill   • buPROPion XL (Wellbutrin XL) 150 MG 24 hr tablet Take 1 tablet by mouth Every Morning. 60 tablet 0   • busPIRone (BUSPAR) 10 MG tablet Take 1 tablet by mouth 2 (Two) Times a Day. 120 tablet 0   • cholecalciferol (VITAMIN D3) 25 MCG (1000 UT) tablet Take 1 tablet by mouth Daily.     • diazePAM (VALIUM) 5 MG tablet Take 1 tablet by mouth Daily. take during premenstrual period only 7 tablet 1   • magnesium oxide (MAG-OX) 400 MG tablet Take 1 tablet by mouth Daily.     • multivitamin with minerals tablet tablet Take 1 tablet by mouth Daily.     • Probiotic Product (ACIDOPHILUS PROBIOTIC BLEND PO) Acidophilus Probiotic Blend     • spironolactone (ALDACTONE) 50 MG tablet Take 1 tablet by mouth Daily.       No current facility-administered medications for this visit.     Past Medical History:  Past Medical History:    Diagnosis Date   • Anxiety    • Depression    • Depression affecting pregnancy 1/7/2021   • Panic disorder    • Self-injurious behavior     As a teenager   • Substance abuse (HCC)      Past Surgical History:  Past Surgical History:   Procedure Laterality Date   • ABDOMINAL SURGERY      4x c sections   • POSTPARTUM TUBAL LIGATION       Mental Status Exam:   Appearance: well-groomed, sits upright, age-appropriate, normal habitus  Behavior: calm, cooperative, appropriate in demeanor, appropriate eye-contact  Mood/affect: euthymic / mood-congruent and appropriate in both range and amplitude  Speech: within expected variance; appropriate rate, appropriate rhythm, appropriate tone; non-pressured  Thought Process: linear, goal-directed; no FOI or ASTER; abstraction intact  Thought Content: coherent, devoid of overt delusions/perceptual disturbances  SI/HI: denies both SI and HI; exhibits future-orientation, self-advocates appropriately, no regular self-harm, no appreciable intent  Memory: no overt deficits  Orientation: oriented to person/place/time/situation  Concentration: appropriate during interview  Intellectual capacity: presumptively average  Insight: fair by given history/exam  Judgment: appropriate by given history/exam  Psychomotor: no appreciable latency/retardation/agitation/tremor  Gait: WNL    Review of Systems:  Review of Systems   Constitutional: Negative for activity change, appetite change and unexpected weight change.   HENT: Negative for drooling.    Eyes: Negative for visual disturbance.   Respiratory: Negative for chest tightness and shortness of breath.    Cardiovascular: Negative for chest pain and palpitations.   Gastrointestinal: Negative for abdominal pain, constipation, diarrhea, nausea and vomiting.   Endocrine: Negative for cold intolerance and heat intolerance.   Genitourinary: Negative for difficulty urinating and frequency.   Musculoskeletal: Negative for myalgias and neck stiffness.  "  Skin: Negative for rash.   Neurological: Negative for dizziness, tremors, seizures and light-headedness.     Vital Signs:   /64   Pulse 73   Ht 160 cm (63\")   Wt 73.1 kg (161 lb 3.2 oz)   BMI 28.56 kg/m²      Lab Results:   No visits with results within 6 Month(s) from this visit.   Latest known visit with results is:   No results found for any previous visit.     EKG Results:  No orders to display     Imaging Results:  No Images in the past 120 days found.    ASSESSMENT AND PLAN:    ICD-10-CM ICD-9-CM   1. MARA (generalized anxiety disorder)  F41.1 300.02   2. Depression, major, recurrent, in complete remission (HCC)  F33.42 296.36   3. PMDD (premenstrual dysphoric disorder)  F32.81 625.4     27 y.o. female who presents today for follow-up regarding PMDD, MARA in the context of PCOS. We have discussed the interval history and the treatment plan below, including potential R/B/SE of the recommended regimen of which the patient demonstrates understanding. Patient is agreeable to call 911 or go to the nearest ER should she become concerned for her own safety and/or the safety of those around her. There are no medication side effects or related complaints today. There are no overt indices of acute raul/psychosis on exam today. ANTHONY reviewed and as expected.    1. Medication regimen: titrate buspirone to 10 mg BID, continue bupropion and rescue diazepam; patient is advised not to misuse prescribed medications or to use them with any exogenous substances that aren't disclosed to this provider as they may interact with the regimen to the patient's detriment.   2. Risk Assessment: protracted risk is low, imminent risk is low - no interval change. Do note that this is subject to change with the Jewish of new stressors, treatment non-adherence, use of substances, and/or new medical ails.   3. Monitoring: N/A  4. Therapy: defer  5. Follow-up: 2 months  6. Communications: N/A    TREATMENT PLAN/GOALS: challenge " patterns of living conducive to symptom burden, implement recommended regimen as above with augmentative, intermittent supportive psychotherapy to reduce symptom burden. Patient acknowledged and verbally consented to continue treatment. The importance of adherence to the recommended treatment and interval follow-up appointments was again emphasized today: patient has good treatment adherence per given history. Patient was today reminded to limit daily caffeine intake, hydrate appropriately, eat healthy and nutritious foods, engage sleep hygiene measures, engage appropriate exposure to sunlight, engage with hobbies in balance with life necessities, and exercise appropriate to their capacity to do so.     Billing: This encounter is of moderate complexity based on number/complexity of problems addressed today and risk of complications/morbidity: 2+ stable chronic illnesses and prescription management.     Parts of this note are electronic transcriptions/translations of spoken language to printed text using the Dragon Dictation system.    Electronically signed by Raul Zambrano MD, 03/16/23, [id number]

## 2023-05-04 DIAGNOSIS — F32.81 PMDD (PREMENSTRUAL DYSPHORIC DISORDER): ICD-10-CM

## 2023-05-04 DIAGNOSIS — F41.1 GAD (GENERALIZED ANXIETY DISORDER): ICD-10-CM

## 2023-05-04 RX ORDER — BUSPIRONE HYDROCHLORIDE 10 MG/1
TABLET ORAL
Qty: 120 TABLET | Refills: 0 | OUTPATIENT
Start: 2023-05-04

## 2023-05-04 RX ORDER — BUPROPION HYDROCHLORIDE 150 MG/1
150 TABLET ORAL EVERY MORNING
Qty: 60 TABLET | Refills: 0 | Status: SHIPPED | OUTPATIENT
Start: 2023-05-04

## 2023-05-04 NOTE — TELEPHONE ENCOUNTER
Medication refill request for Bupropion XL 150mg.     buPROPion XL (Wellbutrin XL) 150 MG 24 hr tablet (03/16/2023)    Pt has upcoming appt  Appointment with Raul Zambrano MD (05/16/2023)    Medication order pended.

## 2023-05-16 ENCOUNTER — OFFICE VISIT (OUTPATIENT)
Dept: PSYCHIATRY | Facility: CLINIC | Age: 28
End: 2023-05-16
Payer: COMMERCIAL

## 2023-05-16 VITALS
WEIGHT: 153.8 LBS | BODY MASS INDEX: 27.25 KG/M2 | SYSTOLIC BLOOD PRESSURE: 122 MMHG | DIASTOLIC BLOOD PRESSURE: 71 MMHG | HEART RATE: 69 BPM | HEIGHT: 63 IN

## 2023-05-16 DIAGNOSIS — F41.1 GAD (GENERALIZED ANXIETY DISORDER): ICD-10-CM

## 2023-05-16 DIAGNOSIS — F33.42 DEPRESSION, MAJOR, RECURRENT, IN COMPLETE REMISSION: Primary | ICD-10-CM

## 2023-05-16 DIAGNOSIS — F32.81 PMDD (PREMENSTRUAL DYSPHORIC DISORDER): ICD-10-CM

## 2023-05-16 RX ORDER — BUSPIRONE HYDROCHLORIDE 10 MG/1
10 TABLET ORAL 2 TIMES DAILY
Qty: 180 TABLET | Refills: 0 | Status: SHIPPED | OUTPATIENT
Start: 2023-05-16

## 2023-05-16 RX ORDER — DIAZEPAM 5 MG/1
5 TABLET ORAL DAILY
Qty: 7 TABLET | Refills: 2 | Status: SHIPPED | OUTPATIENT
Start: 2023-05-16

## 2023-05-16 NOTE — PROGRESS NOTES
"[facility]  Follow-up Visit    Chief Complaint: PMDD    History of Present Illness: Luiza Oneill is a 27 y.o. female who presents today for follow-up regarding PMDD and MARA in the setting of PCOS. Last seen: 03/16 at which time buspirone was titrated. She presents unaccompanied and in no acute distress and engages with me appropriately. Psychotropic regimen perceived to be effective. Side-effects per given history: denies.    Current treatment regimen includes:   - bupropion  mg QD  - buspirone 10 mg BID  - diazepam 1 mg QD PRN (#7 monthly for use during premenstrual period)    Today the patient feels that her mood overall is fairly good, she does continue to struggle in the premenstrual period, but this is better overall. Anxiety has been better on a daily basis with changes made; diazepam and buspirone are noted to be helpful in this regard. Thought process and content are devoid of overt aberration suggestive of acute raul/psychosis. The patient denies SI/HI/AVH. There are no overt changes on exam today compared to most recent evaluation.  - contextual changes: did a local competition recently and had fun with this; plans to visit [place] (no plan in terms of dates yet) and do a mining date after which they'll turn that jewelry into wedding bands, found a \"long lost uncle\" a year younger than her and has been in contact  - sleep: no change, adequate  - appetite: adequate, improved outside of the first days of her menstrual cycle    I have counseled the patient with regard to diagnoses and the recommended treatment regimen as documented below. Patient acknowledges the diagnoses per my rendered interpretation. Patient demonstrates understanding of potential risks/benefits/side effects associated with this regimen and is amenable to proceed in this fashion.     Psychotherapy  - Time: 5 minutes  - interventions employed: the therapeutic alliance was strengthened to " "encourage the patient to express their thoughts and feelings freely. Esteem building was enhanced through praise, reassurance, normalizing/challenging, and encouragement as appropriate. Coping skills were enhanced to build distress tolerance skills and emotional regulation. Allowed patient to freely discuss issues without interruption or judgement with unconditional positive regard, active listening skills, and empathy. Provided a safe, confidential environment to facilitate the development of a positive therapeutic relationship and encourage open, honest communication. Assisted patient in processing session content; acknowledged and normalized/addressed, as appropriate, patient’s thoughts, feelings, and concerns by utilizing a person-centered approach in efforts to build appropriate rapport and a positive therapeutic relationship with open and honest communication.   - Diagnoses: see assessment and plan below  - Symptoms: see subjective above  - Goals   - patient: mitigate anxiety and irritability, sustain improvements to mood   - provider: challenge patterns of living conducive to pathology, strengthen defenses, promote problems solving, restore adaptive functioning and provide symptom relief.  - Treatment plan: continue supportive psychotherapy in subsequent appointments to provide symptom relief; see assessment and plan below for additional details:   - iteration: 1   - progress: partial   - (X)illumination, (X)contextualization, (X)detection, (working)development, (-)elaboration, (-)refinement  - functional status: fair  - mental status exam: as below  - prognosis: good    Psychiatric History:  - Pertinent interval changes: N/A  - Psychotropic medication trials: sertraline after her last pregnancy (helped with some anxiety, but felt estranged from herself, disconnected, had significant sexual SE)  - Key synopsis: cutting as a teen, short-lived - \"more for attention\"; no hx SA    Substance Abuse History:   - " "Pertinent interval changes: N/A  - Key synopsis: opioids \"were a phase around 17\", quit smoking marijuana in     Social History:  - Pertinent interval changes: as above  - Key synopsis: lives in a house with her fiance and 4 children (1.6 YO, 3 YO, 6 YO, 5 YO); LPN; 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[email or web address]; Worship, occasionally goes to [employer]; HIIT, yoga; recently decreased (was drinking a pot of coffee in the past)    Social History     Socioeconomic History   • Marital status: Single   Tobacco Use   • Smoking status: Former     Packs/day: 1.00     Years: 12.00     Pack years: 12.00     Types: Cigarettes     Quit date: 2022     Years since quittin.9   • Tobacco comments:     Quit cold turkey 6 months ago   Vaping Use   • Vaping Use: Former   • Substances: Nicotine, THC, Flavoring   Substance and Sexual Activity   • Alcohol use: Not Currently     Comment: socailly   • Drug use: Not Currently     Types: Marijuana, Oxycodone     Comment: On and off for years. Quit 6 months ago   • Sexual activity: Yes     Partners: Male     Birth control/protection: None, Tubal ligation     Tobacco use counseling/intervention: N/A, patient does not use tobacco; patient has been counseled with regard to risks of tobacco use.    PHQ-9 Depression Screening  PHQ-9 Total Score:      Little interest or pleasure in doing things?     Feeling down, depressed, or hopeless?     Trouble falling or staying asleep, or sleeping too much?     Feeling tired or having little energy?     Poor appetite or overeating?     Feeling bad about yourself - or that you are a failure or have let yourself or your family down?     Trouble concentrating on things, such as reading the newspaper or watching television?     Moving or speaking so slowly that other people could have noticed? Or the opposite - being so fidgety or restless that you have been moving around a lot more than usual?     Thoughts that you would be better off dead, or of hurting yourself in some way?   "   PHQ-9 Total Score       Change in PHQ-9 since last measure: N/A (1)    MARA-7       Change in MARA-7 since last measure: N/A (11)    Problem List:  Patient Active Problem List   Diagnosis   • Depression affecting pregnancy   • Hx of preeclampsia, prior pregnancy, currently pregnant   • Previous  delivery affecting pregnancy   • Rh negative status during pregnancy   • PCOS (polycystic ovarian syndrome)   • MARA (generalized anxiety disorder)   • PMDD (premenstrual dysphoric disorder)   • Depression, major, recurrent, in complete remission     Allergy:   Allergies   Allergen Reactions   • Sulfa Antibiotics Other (See Comments)   • Sulfacetamide Sodium-Sulfur Hives      Discontinued Medications:  Medications Discontinued During This Encounter   Medication Reason   • busPIRone (BUSPAR) 10 MG tablet Reorder   • diazePAM (VALIUM) 5 MG tablet Reorder     Current Medications:   Current Outpatient Medications   Medication Sig Dispense Refill   • buPROPion XL (WELLBUTRIN XL) 150 MG 24 hr tablet TAKE 1 TABLET BY MOUTH EVERY MORNING 60 tablet 0   • busPIRone (BUSPAR) 10 MG tablet Take 1 tablet by mouth 2 (Two) Times a Day. 180 tablet 0   • cholecalciferol (VITAMIN D3) 25 MCG (1000 UT) tablet Take 1 tablet by mouth Daily.     • diazePAM (VALIUM) 5 MG tablet Take 1 tablet by mouth Daily. take during premenstrual period only 7 tablet 2   • magnesium oxide (MAG-OX) 400 MG tablet Take 1 tablet by mouth Daily.     • multivitamin with minerals tablet tablet Take 1 tablet by mouth Daily.     • Probiotic Product (ACIDOPHILUS PROBIOTIC BLEND PO) Acidophilus Probiotic Blend     • spironolactone (ALDACTONE) 50 MG tablet Take 1 tablet by mouth Daily.       No current facility-administered medications for this visit.     Past Medical History:  Past Medical History:   Diagnosis Date   • Anxiety    • Depression    • Depression affecting pregnancy 2021   • Panic disorder    • Self-injurious behavior     As a teenager   • Substance  "abuse      Past Surgical History:  Past Surgical History:   Procedure Laterality Date   • ABDOMINAL SURGERY      4x c sections   • POSTPARTUM TUBAL LIGATION       Mental Status Exam:   Appearance: well-groomed, sits upright, age-appropriate, normal habitus  Behavior: calm, cooperative, appropriate in demeanor, appropriate eye-contact  Mood/affect: euthymic / mood-congruent and appropriate in both range and amplitude  Speech: within expected variance; appropriate rate, appropriate rhythm, appropriate tone; non-pressured  Thought Process: linear, goal-directed; no FOI or ASTER; abstraction intact  Thought Content: coherent, devoid of overt delusions/perceptual disturbances  SI/HI: denies both SI and HI; exhibits future-orientation, self-advocates appropriately, no regular self-harm, no appreciable intent  Memory: no overt deficits  Orientation: oriented to person/place/time/situation  Concentration: appropriate during interview  Intellectual capacity: presumptively average  Insight: fair by given history/exam  Judgment: appropriate by given history/exam  Psychomotor: no appreciable latency/retardation/agitation/tremor  Gait: WNL    Review of Systems:  Review of Systems   Constitutional: Negative for activity change, appetite change and unexpected weight change.   HENT: Negative for drooling.    Eyes: Negative for visual disturbance.   Respiratory: Negative for chest tightness and shortness of breath.    Cardiovascular: Negative for chest pain and palpitations.   Gastrointestinal: Negative for abdominal pain, constipation, diarrhea, nausea and vomiting.   Endocrine: Negative for cold intolerance and heat intolerance.   Genitourinary: Negative for difficulty urinating and frequency.   Musculoskeletal: Negative for myalgias and neck stiffness.   Skin: Negative for rash.   Neurological: Negative for dizziness, tremors, seizures and light-headedness.     Vital Signs:   /71   Pulse 69   Ht 160 cm (63\")   Wt 69.8 kg " (153 lb 12.8 oz)   BMI 27.24 kg/m²      Lab Results:   No visits with results within 6 Month(s) from this visit.   Latest known visit with results is:   No results found for any previous visit.     EKG Results:  No orders to display     Imaging Results:  No Images in the past 120 days found.    ASSESSMENT AND PLAN:    ICD-10-CM ICD-9-CM   1. Depression, major, recurrent, in complete remission  F33.42 296.36   2. MARA (generalized anxiety disorder)  F41.1 300.02   3. PMDD (premenstrual dysphoric disorder)  F32.81 625.4     27 y.o. female who presents today for follow-up regarding PMDD, MARA in the context of PCOS. We have discussed the interval history and the treatment plan below, including potential R/B/SE of the recommended regimen of which the patient demonstrates understanding. Patient is agreeable to call 911 or go to the nearest ER should she become concerned for her own safety and/or the safety of those around her. There are no medication side effects or related complaints today. There are no overt indices of acute raul/psychosis on exam today. ANTHONY reviewed and as expected.    1. Medication regimen: continue buspirone, bupropion, and rescue diazepam; patient is advised not to misuse prescribed medications or to use them with any exogenous substances that aren't disclosed to this provider as they may interact with the regimen to the patient's detriment.   2. Risk Assessment: protracted risk is low, imminent risk is low - no interval change. Do note that this is subject to change with the [employer] of new stressors, treatment non-adherence, use of substances, and/or new medical ails.   3. Monitoring: N/A  4. Therapy: defer  5. Follow-up: 3 months  6. Communications: N/A    TREATMENT PLAN/GOALS: challenge patterns of living conducive to symptom burden, implement recommended regimen as above with augmentative, intermittent supportive psychotherapy to reduce symptom burden. Patient acknowledged and verbally  consented to continue treatment. The importance of adherence to the recommended treatment and interval follow-up appointments was again emphasized today: patient has good treatment adherence per given history. Patient was today reminded to limit daily caffeine intake, hydrate appropriately, eat healthy and nutritious foods, engage sleep hygiene measures, engage appropriate exposure to sunlight, engage with hobbies in balance with life necessities, and exercise appropriate to their capacity to do so.     Billing: This encounter is of moderate complexity based on number/complexity of problems addressed today and risk of complications/morbidity: 2+ stable chronic illnesses and prescription management.     Parts of this note are electronic transcriptions/translations of spoken language to printed text using the Dragon Dictation system.    Electronically signed by Raul Zambrano MD, 05/16/23, 3028

## 2023-08-01 DIAGNOSIS — F32.81 PMDD (PREMENSTRUAL DYSPHORIC DISORDER): ICD-10-CM

## 2023-08-01 RX ORDER — BUPROPION HYDROCHLORIDE 150 MG/1
150 TABLET ORAL EVERY MORNING
Qty: 30 TABLET | Refills: 0 | Status: SHIPPED | OUTPATIENT
Start: 2023-08-01

## 2023-08-14 ENCOUNTER — OFFICE VISIT (OUTPATIENT)
Dept: PSYCHIATRY | Facility: CLINIC | Age: 28
End: 2023-08-14
Payer: COMMERCIAL

## 2023-08-14 VITALS
HEIGHT: 63 IN | WEIGHT: 146.2 LBS | HEART RATE: 71 BPM | SYSTOLIC BLOOD PRESSURE: 123 MMHG | DIASTOLIC BLOOD PRESSURE: 68 MMHG | BODY MASS INDEX: 25.91 KG/M2

## 2023-08-14 DIAGNOSIS — F32.81 PMDD (PREMENSTRUAL DYSPHORIC DISORDER): Primary | ICD-10-CM

## 2023-08-14 DIAGNOSIS — F33.42 DEPRESSION, MAJOR, RECURRENT, IN COMPLETE REMISSION: ICD-10-CM

## 2023-08-14 DIAGNOSIS — F41.1 GAD (GENERALIZED ANXIETY DISORDER): ICD-10-CM

## 2023-08-14 PROCEDURE — 99214 OFFICE O/P EST MOD 30 MIN: CPT | Performed by: PSYCHIATRY & NEUROLOGY

## 2023-08-14 RX ORDER — TIZANIDINE 4 MG/1
TABLET ORAL
COMMUNITY
Start: 2023-07-07 | End: 2023-08-14

## 2023-08-14 RX ORDER — BUPROPION HYDROCHLORIDE 150 MG/1
150 TABLET ORAL EVERY MORNING
Qty: 90 TABLET | Refills: 0 | Status: SHIPPED | OUTPATIENT
Start: 2023-08-14

## 2023-08-14 RX ORDER — IBUPROFEN 800 MG/1
TABLET ORAL
COMMUNITY
Start: 2023-07-07 | End: 2023-08-14

## 2023-08-14 RX ORDER — DIAZEPAM 5 MG/1
5 TABLET ORAL DAILY
Qty: 10 TABLET | Refills: 2 | Status: SHIPPED | OUTPATIENT
Start: 2023-08-14

## 2023-08-14 NOTE — PROGRESS NOTES
[facility]  Follow-up Visit    Chief Complaint: PMDD    History of Present Illness: Luiza Oneill is a 28 y.o. female who presents today for follow-up regarding PMDD and MARA in the setting of PCOS. Last seen: 05/16 at which time no changes were made to her regimen. She presents unaccompanied and in no acute distress and engages with me appropriately. Psychotropic regimen perceived to be effective. Side-effects per given history: denies.    Current treatment regimen includes:   - bupropion  mg QD  - buspirone 10 mg BID  - diazepam 5 mg QD PRN (#7 monthly for use during premenstrual period)    Today she reports things have mostly been going well since our last visit outside of the occasional anxiety that does spike in the premenstrual period, which has been irregular lately and harder to track. She hasn't utilized the diazepam as much as she would have liked to given unpredictable nature of cycle; she noticed this past period was very manageable whereas the one prior was more difficult. Part of her hesitation with taking diazepam when she's uncertain is that she doesn't want to run out only to realize she hadn't been in the premenstrual period; we discussed a slightly higher dispense # in order to account for this and put her more at ease with using the medication PRN; she hasn't filled since May and hasn't needed to given limited use. Mood has been good, feels bupropion has been an excellent help in this regard. Thought process and content are devoid of overt aberration suggestive of acute raul/psychosis. The patient denies SI/HI/AVH. There are no overt changes on exam today compared to most recent evaluation.  - contextual changes: joined [facility] [employer], expecting first fight within the year  - sleep: no change, adequate  - appetite: adequate, improved outside of the first days of her menstrual cycle    I have counseled the patient with regard to diagnoses and the recommended  treatment regimen as documented below. Patient acknowledges the diagnoses per my rendered interpretation. Patient demonstrates understanding of potential risks/benefits/side effects associated with this regimen and is amenable to proceed in this fashion.     Psychotherapy  - Time: 8 minutes  - interventions employed: the therapeutic alliance was strengthened to encourage the patient to express their thoughts and feelings freely. Esteem building was enhanced through praise, reassurance, normalizing/challenging, and encouragement as appropriate. Coping skills were enhanced to build distress tolerance skills and emotional regulation. Allowed patient to freely discuss issues without interruption or judgement with unconditional positive regard, active listening skills, and empathy. Provided a safe, confidential environment to facilitate the development of a positive therapeutic relationship and encourage open, honest communication. Assisted patient in processing session content; acknowledged and normalized/addressed, as appropriate, patient's thoughts, feelings, and concerns by utilizing a person-centered approach in efforts to build appropriate rapport and a positive therapeutic relationship with open and honest communication.   - Diagnoses: see assessment and plan below  - Symptoms: see subjective above  - Goals   - patient: mitigate anxiety and irritability, sustain improvements to mood   - provider: challenge patterns of living conducive to pathology, strengthen defenses, promote problems solving, restore adaptive functioning and provide symptom relief.  - Treatment plan: continue supportive psychotherapy in subsequent appointments to provide symptom relief; see assessment and plan below for additional details:   - iteration: 1   - progress: partial   - (X)illumination, (X)contextualization, (X)detection, (working)development, (-)elaboration, (-)refinement  - functional status: fair  - mental status exam: as below  -  "prognosis: good    Psychiatric History:  - Pertinent interval changes: N/A  - Psychotropic medication trials: sertraline after her last pregnancy (helped with some anxiety, but felt estranged from herself, disconnected, had significant sexual SE)  - Key synopsis: cutting as a teen, short-lived - \"more for attention\"; no hx SA    Substance Abuse History:   - Pertinent interval changes: N/A  - Key synopsis: opioids \"were a phase around 17\", quit smoking marijuana in     Social History:  - Pertinent interval changes: as above  - Key synopsis: lives in a house with her fiance and 4 children (1.6 YO, 3 YO, 6 YO, 5 YO); LPN; 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[employer]; Congregational, occasionally goes to [employer]; HIIT, yoga; recently decreased (was drinking a pot of coffee in the past)    Social History     Socioeconomic History    Marital status: Single   Tobacco Use    Smoking status: Former     Packs/day: 1.00     Years: 12.00     Pack years: 12.00     Types: Cigarettes     Quit date: 2022     Years since quittin.2    Tobacco comments:     Quit cold turkey 6 months ago   Vaping Use    Vaping Use: Former    Substances: Nicotine, THC, Flavoring   Substance and Sexual Activity    Alcohol use: Not Currently     Comment: socailly    Drug use: Not Currently     Types: Marijuana, Oxycodone     Comment: On and off for years. Quit 6 months ago    Sexual activity: Yes     Partners: Male     Birth control/protection: None, Tubal ligation     Tobacco use counseling/intervention: N/A, patient does not use tobacco; patient has been counseled with regard to risks of tobacco use.    PHQ-9 Depression Screening  PHQ-9 Total Score: 1    Little interest or pleasure in doing things? 0-->not at all   Feeling down, depressed, or hopeless? 0-->not at all   Trouble falling or staying asleep, or sleeping too much? 1-->several days   Feeling tired or having little energy? 0-->not at all   Poor appetite or overeating? 0-->not at all   Feeling bad about yourself - or " that you are a failure or have let yourself or your family down? 0-->not at all   Trouble concentrating on things, such as reading the newspaper or watching television? 0-->not at all   Moving or speaking so slowly that other people could have noticed? Or the opposite - being so fidgety or restless that you have been moving around a lot more than usual? 0-->not at all   Thoughts that you would be better off dead, or of hurting yourself in some way? 0-->not at all   PHQ-9 Total Score 1     Change in PHQ-9 since last measure: N/A (1)    MARA-7  Feeling nervous, anxious or on edge: Several days  Not being able to stop or control worrying: Several days  Worrying too much about different things: Several days  Trouble Relaxing: Nearly every day  Being so restless that it is hard to sit still: Nearly every day  Feeling afraid as if something awful might happen: Not at all  Becoming easily annoyed or irritable: More than half the days  MARA 7 Total Score: 11  If you checked any problems, how difficult have these problems made it for you to do your work, take care of things at home, or get along with other people: Somewhat difficult    Change in MARA-7 since last measure: N/A (11)    Problem List:  Patient Active Problem List   Diagnosis    Depression affecting pregnancy    Hx of preeclampsia, prior pregnancy, currently pregnant    Previous  delivery affecting pregnancy    Rh negative status during pregnancy    PCOS (polycystic ovarian syndrome)    MARA (generalized anxiety disorder)    PMDD (premenstrual dysphoric disorder)    Depression, major, recurrent, in complete remission     Allergy:   Allergies   Allergen Reactions    Sulfa Antibiotics Other (See Comments)    Sulfacetamide Sodium-Sulfur Hives      Discontinued Medications:  Medications Discontinued During This Encounter   Medication Reason    ibuprofen (ADVIL,MOTRIN) 800 MG tablet     tiZANidine (ZANAFLEX) 4 MG tablet     diazePAM (VALIUM) 5 MG tablet Reorder     buPROPion XL (WELLBUTRIN XL) 150 MG 24 hr tablet Reorder     Current Medications:   Current Outpatient Medications   Medication Sig Dispense Refill    buPROPion XL (WELLBUTRIN XL) 150 MG 24 hr tablet Take 1 tablet by mouth Every Morning. 90 tablet 0    busPIRone (BUSPAR) 10 MG tablet Take 1 tablet by mouth 2 (Two) Times a Day. 180 tablet 0    cholecalciferol (VITAMIN D3) 25 MCG (1000 UT) tablet Take 1 tablet by mouth Daily.      diazePAM (VALIUM) 5 MG tablet Take 1 tablet by mouth Daily. take during premenstrual period only 10 tablet 2    magnesium oxide (MAG-OX) 400 MG tablet Take 1 tablet by mouth Daily.      multivitamin with minerals tablet tablet Take 1 tablet by mouth Daily.      Probiotic Product (ACIDOPHILUS PROBIOTIC BLEND PO) Acidophilus Probiotic Blend      spironolactone (ALDACTONE) 50 MG tablet Take 1 tablet by mouth Daily.       No current facility-administered medications for this visit.     Past Medical History:  Past Medical History:   Diagnosis Date    Anxiety     Depression     Depression affecting pregnancy 1/7/2021    Panic disorder     Self-injurious behavior     As a teenager    Substance abuse      Past Surgical History:  Past Surgical History:   Procedure Laterality Date    ABDOMINAL SURGERY      4x c sections    POSTPARTUM TUBAL LIGATION       Mental Status Exam:   Appearance: well-groomed, sits upright, age-appropriate, normal habitus  Behavior: calm, cooperative, appropriate in demeanor, appropriate eye-contact  Mood/affect: euthymic / mood-congruent and appropriate in both range and amplitude  Speech: within expected variance; appropriate rate, appropriate rhythm, appropriate tone; non-pressured  Thought Process: linear, goal-directed; no FOI or ASTER; abstraction intact  Thought Content: coherent, devoid of overt delusions/perceptual disturbances  SI/HI: denies both SI and HI; exhibits future-orientation, self-advocates appropriately, no regular self-harm, no appreciable intent  Memory:  "no overt deficits  Orientation: oriented to person/place/time/situation  Concentration: appropriate during interview  Intellectual capacity: presumptively average  Insight: fair by given history/exam  Judgment: appropriate by given history/exam  Psychomotor: no appreciable latency/retardation/agitation/tremor  Gait: WNL    Review of Systems:  Review of Systems   Constitutional:  Negative for activity change, appetite change and unexpected weight change.   HENT:  Negative for drooling.    Eyes:  Negative for visual disturbance.   Respiratory:  Negative for chest tightness and shortness of breath.    Cardiovascular:  Negative for chest pain and palpitations.   Gastrointestinal:  Negative for abdominal pain, constipation, diarrhea, nausea and vomiting.   Endocrine: Negative for cold intolerance and heat intolerance.   Genitourinary:  Negative for difficulty urinating and frequency.   Musculoskeletal:  Negative for myalgias and neck stiffness.   Skin:  Negative for rash.   Neurological:  Negative for dizziness, tremors, seizures and light-headedness.     Vital Signs:   /68   Pulse 71   Ht 160 cm (63\")   Wt 66.3 kg (146 lb 3.2 oz)   BMI 25.90 kg/mý      Lab Results:   No visits with results within 6 Month(s) from this visit.   Latest known visit with results is:   No results found for any previous visit.     EKG Results:  No orders to display     Imaging Results:  No Images in the past 120 days found.    ASSESSMENT AND PLAN:    ICD-10-CM ICD-9-CM   1. PMDD (premenstrual dysphoric disorder)  F32.81 625.4   2. MARA (generalized anxiety disorder)  F41.1 300.02   3. Depression, major, recurrent, in complete remission  F33.42 296.36     28 y.o. female who presents today for follow-up regarding PMDD, MARA in the context of PCOS. We have discussed the interval history and the treatment plan below, including potential R/B/SE of the recommended regimen of which the patient demonstrates understanding. Patient is agreeable " to call 911 or go to the nearest ER should she become concerned for her own safety and/or the safety of those around her. There are no medication side effects or related complaints today. There are no overt indices of acute raul/psychosis on exam today. ANTHONY reviewed and as expected.    Medication regimen: continue buspirone, bupropion, and rescue diazepam (#10 monthly to account for irregular cycle); patient is advised not to misuse prescribed medications or to use them with any exogenous substances that aren't disclosed to this provider as they may interact with the regimen to the patient's detriment.   Risk Assessment: protracted risk is low, imminent risk is low - no interval change. Do note that this is subject to change with the [place] of new stressors, treatment non-adherence, use of substances, and/or new medical ails.   Monitoring: N/A  Therapy: defer  Follow-up: 3 months  Communications: N/A    TREATMENT PLAN/GOALS: challenge patterns of living conducive to symptom burden, implement recommended regimen as above with augmentative, intermittent supportive psychotherapy to reduce symptom burden. Patient acknowledged and verbally consented to continue treatment. The importance of adherence to the recommended treatment and interval follow-up appointments was again emphasized today: patient has good treatment adherence per given history. Patient was today reminded to limit daily caffeine intake, hydrate appropriately, eat healthy and nutritious foods, engage sleep hygiene measures, engage appropriate exposure to sunlight, engage with hobbies in balance with life necessities, and exercise appropriate to their capacity to do so.     Billing: This encounter is of moderate complexity based on number/complexity of problems addressed today and risk of complications/morbidity: 2+ stable chronic illnesses and prescription management.     Parts of this note are electronic transcriptions/translations of spoken language  to printed text using the Dragon Dictation system.    Electronically signed by Raul Zambrano MD, 08/14/23, [id number]

## 2023-11-10 DIAGNOSIS — F32.81 PMDD (PREMENSTRUAL DYSPHORIC DISORDER): ICD-10-CM

## 2023-11-10 DIAGNOSIS — F41.1 GAD (GENERALIZED ANXIETY DISORDER): ICD-10-CM

## 2023-11-10 RX ORDER — BUSPIRONE HYDROCHLORIDE 10 MG/1
10 TABLET ORAL 2 TIMES DAILY
Qty: 180 TABLET | Refills: 0 | Status: SHIPPED | OUTPATIENT
Start: 2023-11-10

## 2023-11-10 RX ORDER — BUPROPION HYDROCHLORIDE 150 MG/1
150 TABLET ORAL EVERY MORNING
Qty: 90 TABLET | Refills: 0 | Status: SHIPPED | OUTPATIENT
Start: 2023-11-10

## 2023-11-10 NOTE — TELEPHONE ENCOUNTER
PT(PATIENT) VERIFIED     PT(PATIENT) STATES SHE WILL ALSO NEED A REFILL OF   buPROPion XL (WELLBUTRIN XL) 150 MG 24 hr tablet (2023)     PT(PATIENT) CONFIRMED [facility] AS CORRECT [facility]ARMACY     PT(PATIENT) STATES SHE WILL RUN OUT OF MEDICATION BEFORE HER NEXT APPT     NEXT APPT WITH PROVIDER  Appointment with Raul Zambrano MD (2023)     PROVIDER PLEASE ADVISE

## 2023-12-13 ENCOUNTER — OFFICE VISIT (OUTPATIENT)
Dept: PSYCHIATRY | Facility: CLINIC | Age: 28
End: 2023-12-13
Payer: COMMERCIAL

## 2023-12-13 VITALS
BODY MASS INDEX: 25.52 KG/M2 | HEIGHT: 63 IN | WEIGHT: 144 LBS | DIASTOLIC BLOOD PRESSURE: 63 MMHG | HEART RATE: 72 BPM | SYSTOLIC BLOOD PRESSURE: 126 MMHG

## 2023-12-13 DIAGNOSIS — F32.81 PMDD (PREMENSTRUAL DYSPHORIC DISORDER): Primary | ICD-10-CM

## 2023-12-13 DIAGNOSIS — F33.42 DEPRESSION, MAJOR, RECURRENT, IN COMPLETE REMISSION: ICD-10-CM

## 2023-12-13 DIAGNOSIS — F41.1 GAD (GENERALIZED ANXIETY DISORDER): ICD-10-CM

## 2023-12-13 PROCEDURE — 99214 OFFICE O/P EST MOD 30 MIN: CPT | Performed by: PSYCHIATRY & NEUROLOGY

## 2023-12-13 RX ORDER — DIAZEPAM 5 MG/1
5 TABLET ORAL DAILY PRN
Qty: 10 TABLET | Refills: 5 | Status: SHIPPED | OUTPATIENT
Start: 2023-12-13

## 2023-12-13 RX ORDER — BUPROPION HYDROCHLORIDE 150 MG/1
150 TABLET ORAL EVERY MORNING
Qty: 90 TABLET | Refills: 1 | Status: SHIPPED | OUTPATIENT
Start: 2023-12-13

## 2023-12-13 RX ORDER — BUSPIRONE HYDROCHLORIDE 10 MG/1
10 TABLET ORAL 2 TIMES DAILY
Qty: 180 TABLET | Refills: 1 | Status: SHIPPED | OUTPATIENT
Start: 2023-12-13

## 2023-12-13 NOTE — PROGRESS NOTES
[facility]  Follow-up Visit    Chief Complaint: PMDD    History of Present Illness: Luiza Oneill is a 28 y.o. female who presents today for follow-up regarding PMDD and MRAA in the setting of PCOS. Last seen: 08/14 at which time no changes were made to her regimen. She presents unaccompanied and in no acute distress and engages with me appropriately. Psychotropic regimen perceived to be effective. Side-effects per given history: denies.    Current treatment regimen includes:   - bupropion  mg QD  - buspirone 10 mg BID  - diazepam 5 mg QD PRN (#10 monthly for use during premenstrual period)    Today she reports she's been doing great. PMDD symptoms are adequately managed with current interventions. MARA symptoms are adequately managed with current interventions. Sometimes has noise sensitivity, overwhelm that the medications do help to manage. Thought process and content are devoid of overt aberration suggestive of acute raul/psychosis. The patient denies SI/HI/AVH. There are no overt changes on exam today compared to most recent evaluation.  - contextual changes: had first MMA fight and won, another planned for 02/03 in [place]  - sleep: adequate, no change  - appetite: adequate, improved outside of the first days of her menstrual cycle    I have counseled the patient with regard to diagnoses and the recommended treatment regimen as documented below. Patient acknowledges the diagnoses per my rendered interpretation. Patient demonstrates understanding of potential risks/benefits/side effects associated with this regimen and is amenable to proceed in this fashion.     Psychotherapy  - Time: 4 minutes  - interventions employed: the therapeutic alliance was strengthened to encourage the patient to express their thoughts and feelings freely. Esteem building was enhanced through praise, reassurance, normalizing/challenging, and encouragement as appropriate. Coping  "skills were enhanced to build distress tolerance skills and emotional regulation. Allowed patient to freely discuss issues without interruption or judgement with unconditional positive regard, active listening skills, and empathy. Provided a safe, confidential environment to facilitate the development of a positive therapeutic relationship and encourage open, honest communication. Assisted patient in processing session content; acknowledged and normalized/addressed, as appropriate, patient’s thoughts, feelings, and concerns by utilizing a person-centered approach in efforts to build appropriate rapport and a positive therapeutic relationship with open and honest communication.   - Diagnoses: see assessment and plan below  - Symptoms: see subjective above  - Goals   - patient: mitigate anxiety and irritability, sustain improvements to mood   - provider: challenge patterns of living conducive to pathology, strengthen defenses, promote problems solving, restore adaptive functioning and provide symptom relief.  - Treatment plan: continue supportive psychotherapy in subsequent appointments to provide symptom relief; see assessment and plan below for additional details:   - iteration: 1   - progress: partial   - (X)illumination, (X)contextualization, (X)detection, (working)development, (-)elaboration, (-)refinement  - functional status: fair  - mental status exam: as below  - prognosis: good    Psychiatric History:  - Pertinent interval changes: N/A  - Psychotropic medication trials: sertraline after her last pregnancy (helped with some anxiety, but felt estranged from herself, disconnected, had significant sexual SE)  - Key synopsis: cutting as a teen, short-lived - \"more for attention\"; no hx SA    Substance Abuse History:   - Pertinent interval changes: N/A  - Key synopsis: opioids \"were a phase around 17\", quit smoking marijuana in 2022    Social History:  - Pertinent interval changes: as above  - Key synopsis: lives " in a house with her fiance and 4 children (1.6 YO, 3 YO, 6 YO, 7 YO); LPN; 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[employer]; Sabianism, occasionally goes to Muslim; HIIT, yoga; recently decreased (was drinking a pot of coffee in the past)    Social History     Socioeconomic History    Marital status: Single   Tobacco Use    Smoking status: Former     Packs/day: 1.00     Years: 12.00     Additional pack years: 0.00     Total pack years: 12.00     Types: Cigarettes     Quit date: 2022     Years since quittin.5    Smokeless tobacco: Never    Tobacco comments:     Quit cold turkey 6 months ago   Vaping Use    Vaping Use: Former    Substances: Nicotine, THC, Flavoring   Substance and Sexual Activity    Alcohol use: Not Currently     Comment: socailly    Drug use: Not Currently     Types: Marijuana, Oxycodone     Comment: On and off for years. Quit 6 months ago    Sexual activity: Yes     Partners: Male     Birth control/protection: None, Tubal ligation     Tobacco use counseling/intervention: N/A, patient does not use tobacco; patient has been counseled with regard to risks of tobacco use.    PHQ-9 Depression Screening  PHQ-9 Total Score:      Little interest or pleasure in doing things?     Feeling down, depressed, or hopeless?     Trouble falling or staying asleep, or sleeping too much?     Feeling tired or having little energy?     Poor appetite or overeating?     Feeling bad about yourself - or that you are a failure or have let yourself or your family down?     Trouble concentrating on things, such as reading the newspaper or watching television?     Moving or speaking so slowly that other people could have noticed? Or the opposite - being so fidgety or restless that you have been moving around a lot more than usual?     Thoughts that you would be better off dead, or of hurting yourself in some way?     PHQ-9 Total Score       Change in PHQ-9 since last measure: N/A (1)    MARA-7       Change in MARA-7 since last measure: N/A (11)    Problem  List:  Patient Active Problem List   Diagnosis    Depression affecting pregnancy    Hx of preeclampsia, prior pregnancy, currently pregnant    Previous  delivery affecting pregnancy    Rh negative status during pregnancy    PCOS (polycystic ovarian syndrome)    MARA (generalized anxiety disorder)    PMDD (premenstrual dysphoric disorder)    Depression, major, recurrent, in complete remission     Allergy:   Allergies   Allergen Reactions    Sulfa Antibiotics Other (See Comments)    Sulfacetamide Sodium-Sulfur Hives      Discontinued Medications:  There are no discontinued medications.    Current Medications:   Current Outpatient Medications   Medication Sig Dispense Refill    buPROPion XL (WELLBUTRIN XL) 150 MG 24 hr tablet Take 1 tablet by mouth Every Morning. 90 tablet 0    busPIRone (BUSPAR) 10 MG tablet TAKE 1 TABLET BY MOUTH TWICE DAILY 180 tablet 0    cholecalciferol (VITAMIN D3) 25 MCG (1000 UT) tablet Take 1 tablet by mouth Daily.      diazePAM (VALIUM) 5 MG tablet Take 1 tablet by mouth Daily. take during premenstrual period only 10 tablet 2    multivitamin with minerals tablet tablet Take 1 tablet by mouth Daily.      Probiotic Product (ACIDOPHILUS PROBIOTIC BLEND PO) Acidophilus Probiotic Blend      spironolactone (ALDACTONE) 50 MG tablet Take 1 tablet by mouth Daily.      magnesium oxide (MAG-OX) 400 MG tablet Take 1 tablet by mouth Daily. (Patient not taking: Reported on 2023)       No current facility-administered medications for this visit.     Past Medical History:  Past Medical History:   Diagnosis Date    Anxiety     Depression     Depression affecting pregnancy 2021    Panic disorder     Self-injurious behavior     As a teenager    Substance abuse      Past Surgical History:  Past Surgical History:   Procedure Laterality Date    ABDOMINAL SURGERY      4x c sections    POSTPARTUM TUBAL LIGATION       Mental Status Exam:   Appearance: well-groomed, sits upright, age-appropriate,  "normal habitus  Behavior: calm, cooperative, appropriate in demeanor, appropriate eye-contact  Mood/affect: euthymic / mood-congruent and appropriate in both range and amplitude  Speech: within expected variance; appropriate rate, appropriate rhythm, appropriate tone; non-pressured  Thought Process: linear, goal-directed; no FOI or ASTER; abstraction intact  Thought Content: coherent, devoid of overt delusions/perceptual disturbances  SI/HI: denies both SI and HI; exhibits future-orientation, self-advocates appropriately, no regular self-harm, no appreciable intent  Memory: no overt deficits  Orientation: oriented to person/place/time/situation  Concentration: appropriate during interview  Intellectual capacity: presumptively average  Insight: fair by given history/exam  Judgment: appropriate by given history/exam  Psychomotor: no appreciable latency/retardation/agitation/tremor  Gait: WNL    Review of Systems:  Review of Systems   Constitutional:  Negative for activity change, appetite change and unexpected weight change.   HENT:  Negative for drooling.    Eyes:  Negative for visual disturbance.   Respiratory:  Negative for chest tightness and shortness of breath.    Cardiovascular:  Negative for chest pain and palpitations.   Gastrointestinal:  Negative for abdominal pain, constipation, diarrhea, nausea and vomiting.   Endocrine: Negative for cold intolerance and heat intolerance.   Genitourinary:  Negative for difficulty urinating and frequency.   Musculoskeletal:  Negative for myalgias and neck stiffness.   Skin:  Negative for rash.   Neurological:  Negative for dizziness, tremors, seizures and light-headedness.     Vital Signs:   /63   Pulse 72   Ht 160 cm (63\")   Wt 65.3 kg (144 lb)   BMI 25.51 kg/m²      Lab Results:   No visits with results within 6 Month(s) from this visit.   Latest known visit with results is:   No results found for any previous visit.     EKG Results:  No orders to display "     Imaging Results:  No Images in the past 120 days found.    ASSESSMENT AND PLAN:    ICD-10-CM ICD-9-CM   1. PMDD (premenstrual dysphoric disorder)  F32.81 625.4   2. MARA (generalized anxiety disorder)  F41.1 300.02   3. Depression, major, recurrent, in complete remission  F33.42 296.36     28 y.o. female who presents today for follow-up regarding PMDD, MARA in the context of PCOS. We have discussed the interval history and the treatment plan below, including potential R/B/SE of the recommended regimen of which the patient demonstrates understanding. Patient is agreeable to call 911 or go to the nearest ER should she become concerned for her own safety and/or the safety of those around her. There are no medication side effects or related complaints today. There are no overt indices of acute raul/psychosis on exam today. ANTHONY reviewed and as expected.    Medication regimen: no change - continue buspirone, bupropion, and rescue diazepam (#10 monthly to account for irregular cycle); patient is advised not to misuse prescribed medications or to use them with any exogenous substances that aren't disclosed to this provider as they may interact with the regimen to the patient's detriment.   Risk Assessment: protracted risk is low, imminent risk is low - no interval change. Do note that this is subject to change with the Hinduism of new stressors, treatment non-adherence, use of substances, and/or new medical ails.   Monitoring: N/A  Therapy: defer  Follow-up: 6 months  Communications: N/A    TREATMENT PLAN/GOALS: challenge patterns of living conducive to symptom burden, implement recommended regimen as above with augmentative, intermittent supportive psychotherapy to reduce symptom burden. Patient acknowledged and verbally consented to continue treatment. The importance of adherence to the recommended treatment and interval follow-up appointments was again emphasized today: patient has good treatment adherence per given  history. Patient was today reminded to limit daily caffeine intake, hydrate appropriately, eat healthy and nutritious foods, engage sleep hygiene measures, engage appropriate exposure to sunlight, engage with hobbies in balance with life necessities, and exercise appropriate to their capacity to do so.     Billing: This encounter is of moderate complexity based on number/complexity of problems addressed today and risk of complications/morbidity: 2+ stable chronic illnesses and prescription management.     Parts of this note are electronic transcriptions/translations of spoken language to printed text using the Dragon Dictation system.    Electronically signed by Raul Zambrano MD, 12/13/23, [id number]

## 2024-02-10 DIAGNOSIS — F41.1 GAD (GENERALIZED ANXIETY DISORDER): ICD-10-CM

## 2024-02-12 RX ORDER — BUSPIRONE HYDROCHLORIDE 10 MG/1
10 TABLET ORAL 2 TIMES DAILY
Qty: 180 TABLET | Refills: 1 | OUTPATIENT
Start: 2024-02-12

## 2024-06-13 ENCOUNTER — OFFICE VISIT (OUTPATIENT)
Dept: PSYCHIATRY | Facility: CLINIC | Age: 29
End: 2024-06-13
Payer: MEDICAID

## 2024-06-13 VITALS
BODY MASS INDEX: 25.02 KG/M2 | WEIGHT: 141.2 LBS | HEIGHT: 63 IN | DIASTOLIC BLOOD PRESSURE: 70 MMHG | SYSTOLIC BLOOD PRESSURE: 125 MMHG | HEART RATE: 99 BPM

## 2024-06-13 DIAGNOSIS — F41.1 GAD (GENERALIZED ANXIETY DISORDER): Primary | ICD-10-CM

## 2024-06-13 DIAGNOSIS — F32.81 PMDD (PREMENSTRUAL DYSPHORIC DISORDER): ICD-10-CM

## 2024-06-13 DIAGNOSIS — F33.42 DEPRESSION, MAJOR, RECURRENT, IN COMPLETE REMISSION: ICD-10-CM

## 2024-06-13 RX ORDER — BUSPIRONE HYDROCHLORIDE 10 MG/1
10 TABLET ORAL 2 TIMES DAILY
Qty: 180 TABLET | Refills: 1 | Status: SHIPPED | OUTPATIENT
Start: 2024-06-13

## 2024-06-13 RX ORDER — BUPROPION HYDROCHLORIDE 150 MG/1
150 TABLET ORAL EVERY MORNING
Qty: 90 TABLET | Refills: 1 | Status: SHIPPED | OUTPATIENT
Start: 2024-06-13

## 2024-06-13 RX ORDER — DIAZEPAM 5 MG/1
5 TABLET ORAL DAILY PRN
Qty: 10 TABLET | Refills: 5 | Status: SHIPPED | OUTPATIENT
Start: 2024-07-02

## 2024-06-13 RX ORDER — FLUCONAZOLE 150 MG/1
TABLET ORAL
COMMUNITY
Start: 2024-02-19 | End: 2024-06-13

## 2024-06-13 RX ORDER — AMOXICILLIN 875 MG/1
1 TABLET, COATED ORAL EVERY 12 HOURS SCHEDULED
COMMUNITY
Start: 2024-02-19 | End: 2024-06-13

## 2024-06-13 RX ORDER — CEPHALEXIN 500 MG/1
1 CAPSULE ORAL 3 TIMES DAILY
COMMUNITY
Start: 2024-04-24 | End: 2024-06-13

## 2024-06-13 NOTE — PROGRESS NOTES
[facility]  Follow-up Visit    Chief Complaint: PMDD    History of Present Illness: Luiza Oneill is a 28 y.o. female who presents today for follow-up regarding PMDD and MARA in the setting of PCOS. Last seen: 12/13 at which time no changes were made to her regimen. She presents unaccompanied and in no acute distress and engages with me appropriately. Psychotropic regimen perceived to be effective. Side-effects per given history: denies.    Current treatment regimen includes:   - bupropion  mg QD  - buspirone 10 mg BID  - diazepam 5 mg QD PRN (#10 monthly for use during premenstrual period)    Today she reports she's been doing great. PMDD symptoms are fairly managed with current interventions; she has experienced some depressive symptoms during this time - these have been manageable with current interventions. MARA symptoms are adequately managed with current interventions. Discussed prospect of a Rx sleep aid, patient is disinterested at this time. Thought process and content are devoid of overt aberration suggestive of acute raul/psychosis. The patient denies SI/HI/AVH. There are no overt changes on exam today compared to most recent evaluation.  - contextual changes: has another fight coming up (07/27 - [place]); she is officially engaged (this was done on the Eclipse; wedding day set for 05/2025)  - sleep: issues with induction more recently  - appetite: generally adequate; no change    I have counseled the patient with regard to diagnoses and the recommended treatment regimen as documented below. Patient acknowledges the diagnoses per my rendered interpretation. Patient demonstrates understanding of potential risks/benefits/side effects associated with this regimen and is amenable to proceed in this fashion.     Psychotherapy  - Time: 5 minutes  - interventions employed: the therapeutic alliance was strengthened to encourage the patient to express their  "thoughts and feelings freely. Esteem building was enhanced through praise, reassurance, normalizing/challenging, and encouragement as appropriate. Coping skills were enhanced to build distress tolerance skills and emotional regulation. Allowed patient to freely discuss issues without interruption or judgement with unconditional positive regard, active listening skills, and empathy. Provided a safe, confidential environment to facilitate the development of a positive therapeutic relationship and encourage open, honest communication. Assisted patient in processing session content; acknowledged and normalized/addressed, as appropriate, patient’s thoughts, feelings, and concerns by utilizing a person-centered approach in efforts to build appropriate rapport and a positive therapeutic relationship with open and honest communication.   - Diagnoses: see assessment and plan below  - Symptoms: see subjective above  - Goals   - patient: mitigate anxiety and irritability, sustain improvements to mood   - provider: challenge patterns of living conducive to pathology, strengthen defenses, promote problems solving, restore adaptive functioning and provide symptom relief.  - Treatment plan: continue supportive psychotherapy in subsequent appointments to provide symptom relief; see assessment and plan below for additional details:   - iteration: 1   - progress: partial   - (X)illumination, (X)contextualization, (X)detection, (working)development, (-)elaboration, (-)refinement  - functional status: fair  - mental status exam: as below  - prognosis: good    Psychiatric History:  - Pertinent interval changes: N/A  - Psychotropic medication trials: sertraline after her last pregnancy (helped with some anxiety, but felt estranged from herself, disconnected, had significant sexual SE)  - Key synopsis: cutting as a teen, short-lived - \"more for attention\"; no hx SA    Substance Abuse History:   - Pertinent interval changes: N/A  - Key " "synopsis: opioids \"were a phase around 17\", quit smoking marijuana in     Social History:  - Pertinent interval changes: as above  - Key synopsis: lives in a house with her fiance and 4 children (1.6 YO, 3 YO, 6 YO, 7 YO); LPN; 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[employer]; [employer], occasionally goes to [employer]; HIIT, yoga; recently decreased (was drinking a pot of coffee in the past)    Social History     Socioeconomic History    Marital status: Single   Tobacco Use    Smoking status: Former     Current packs/day: 0.00     Average packs/day: 1 pack/day for 12.0 years (12.0 ttl pk-yrs)     Types: Cigarettes     Start date: 2010     Quit date: 2022     Years since quittin.0    Smokeless tobacco: Never    Tobacco comments:     Quit cold turkey 6 months ago   Vaping Use    Vaping status: Former    Substances: Nicotine, THC, Flavoring   Substance and Sexual Activity    Alcohol use: Not Currently     Comment: socailly    Drug use: Not Currently     Types: Marijuana, Oxycodone     Comment: On and off for years. Quit 6 months ago    Sexual activity: Yes     Partners: Male     Birth control/protection: None, Tubal ligation     Tobacco use counseling/intervention: N/A, patient does not use tobacco; patient has been counseled with regard to risks of tobacco use.    PHQ-9 Depression Screening  PHQ-9 Total Score: 5    Little interest or pleasure in doing things? 0-->not at all   Feeling down, depressed, or hopeless? 1-->several days   Trouble falling or staying asleep, or sleeping too much? 2-->more than half the days   Feeling tired or having little energy? 0-->not at all   Poor appetite or overeating? 0-->not at all   Feeling bad about yourself - or that you are a failure or have let yourself or your family down? 1-->several days   Trouble concentrating on things, such as reading the newspaper or watching television? 1-->several days   Moving or speaking so slowly that other people could have noticed? Or the opposite - being so fidgety or " restless that you have been moving around a lot more than usual? 0-->not at all   Thoughts that you would be better off dead, or of hurting yourself in some way? 0-->not at all   PHQ-9 Total Score 5     Change in PHQ-9 since last measure: +4 (1)    MARA-7  Feeling nervous, anxious or on edge: Several days  Not being able to stop or control worrying: Not at all  Worrying too much about different things: Several days  Trouble Relaxing: Several days  Being so restless that it is hard to sit still: Not at all  Feeling afraid as if something awful might happen: Not at all  Becoming easily annoyed or irritable: Several days  MARA 7 Total Score: 4  If you checked any problems, how difficult have these problems made it for you to do your work, take care of things at home, or get along with other people: Somewhat difficult    Change in MARA-7 since last measure: -7 (11)    Problem List:  Patient Active Problem List   Diagnosis    Depression affecting pregnancy    Hx of preeclampsia, prior pregnancy, currently pregnant    Previous  delivery affecting pregnancy    Rh negative status during pregnancy    PCOS (polycystic ovarian syndrome)    MARA (generalized anxiety disorder)    PMDD (premenstrual dysphoric disorder)    Depression, major, recurrent, in complete remission     Allergy:   Allergies   Allergen Reactions    Sulfa Antibiotics Other (See Comments)    Sulfacetamide Sodium-Sulfur Hives      Discontinued Medications:  Medications Discontinued During This Encounter   Medication Reason    amoxicillin (AMOXIL) 875 MG tablet *Therapy completed    fluconazole (DIFLUCAN) 150 MG tablet *Therapy completed    cephalexin (KEFLEX) 500 MG capsule *Therapy completed    cholecalciferol (VITAMIN D3) 25 MCG (1000 UT) tablet *Therapy completed    magnesium oxide (MAG-OX) 400 MG tablet *Therapy completed    buPROPion XL (WELLBUTRIN XL) 150 MG 24 hr tablet Reorder    busPIRone (BUSPAR) 10 MG tablet Reorder    diazePAM (VALIUM) 5 MG  tablet Reorder     Current Medications:   Current Outpatient Medications   Medication Sig Dispense Refill    buPROPion XL (WELLBUTRIN XL) 150 MG 24 hr tablet Take 1 tablet by mouth Every Morning. 90 tablet 1    busPIRone (BUSPAR) 10 MG tablet Take 1 tablet by mouth 2 (Two) Times a Day. 180 tablet 1    [START ON 7/2/2024] diazePAM (VALIUM) 5 MG tablet Take 1 tablet by mouth Daily As Needed for Anxiety. take during premenstrual period only 10 tablet 5    multivitamin with minerals tablet tablet Take 1 tablet by mouth Daily.      Probiotic Product (ACIDOPHILUS PROBIOTIC BLEND PO) Acidophilus Probiotic Blend      spironolactone (ALDACTONE) 50 MG tablet Take 1 tablet by mouth Daily.       No current facility-administered medications for this visit.     Past Medical History:  Past Medical History:   Diagnosis Date    Anxiety     Depression     Depression affecting pregnancy 1/7/2021    Panic disorder     Self-injurious behavior     As a teenager    Substance abuse      Past Surgical History:  Past Surgical History:   Procedure Laterality Date    ABDOMINAL SURGERY      4x c sections    POSTPARTUM TUBAL LIGATION       Mental Status Exam:   Appearance: well-groomed, sits upright, age-appropriate, normal habitus  Behavior: calm, cooperative, appropriate in demeanor, appropriate eye-contact  Mood/affect: euthymic / mood-congruent and appropriate in both range and amplitude  Speech: within expected variance; appropriate rate, appropriate rhythm, appropriate tone; non-pressured  Thought Process: linear, goal-directed; no FOI or ASTER; abstraction intact  Thought Content: coherent, devoid of overt delusions/perceptual disturbances  SI/HI: denies both SI and HI; exhibits future-orientation, self-advocates appropriately, no regular self-harm, no appreciable intent  Memory: no overt deficits  Orientation: oriented to person/place/time/situation  Concentration: appropriate during interview  Intellectual capacity: presumptively  "average  Insight: fair by given history/exam  Judgment: appropriate by given history/exam  Psychomotor: no appreciable latency/retardation/agitation/tremor  Gait: WNL    Review of Systems:  Review of Systems   Constitutional:  Negative for activity change, appetite change and unexpected weight change.   Gastrointestinal:  Negative for abdominal pain and nausea.   Psychiatric/Behavioral:  Positive for sleep disturbance. Negative for agitation.      Vital Signs:   /70   Pulse 99   Ht 160 cm (63\")   Wt 64 kg (141 lb 3.2 oz)   BMI 25.01 kg/m²      Lab Results:   No visits with results within 6 Month(s) from this visit.   Latest known visit with results is:   No results found for any previous visit.     EKG Results:  No orders to display     Imaging Results:  No Images in the past 120 days found.    ASSESSMENT AND PLAN:    ICD-10-CM ICD-9-CM   1. MARA (generalized anxiety disorder)  F41.1 300.02   2. PMDD (premenstrual dysphoric disorder)  F32.81 625.4   3. Depression, major, recurrent, in complete remission  F33.42 296.36     28 y.o. female who presents today for follow-up regarding PMDD, MARA in the context of PCOS. We have discussed the interval history and the treatment plan below, including potential R/B/SE of the recommended regimen of which the patient demonstrates understanding. Patient is agreeable to call 911 or go to the nearest ER should she become concerned for her own safety and/or the safety of those around her. There are no medication side effects or related complaints today. There are no overt indices of acute raul/psychosis on exam today. ANTHONY reviewed and as expected.    Medication regimen: no change - continue buspirone, bupropion, and rescue diazepam (#10 monthly to account for irregular cycle); patient is advised not to misuse prescribed medications or to use them with any exogenous substances that aren't disclosed to this provider as they may interact with the regimen to the patient's " detriment.   Risk Assessment: protracted risk is low, imminent risk is low - no interval change. Do note that this is subject to change with the Restorationist of new stressors, treatment non-adherence, use of substances, and/or new medical ails.   Monitoring: N/A  Therapy: defer  Follow-up: 6 months  Communications: N/A    TREATMENT PLAN/GOALS: challenge patterns of living conducive to symptom burden, implement recommended regimen as above with augmentative, intermittent supportive psychotherapy to reduce symptom burden. Patient acknowledged and verbally consented to continue treatment. The importance of adherence to the recommended treatment and interval follow-up appointments was again emphasized today: patient has good treatment adherence per given history. Patient was today reminded to limit daily caffeine intake, hydrate appropriately, eat healthy and nutritious foods, engage sleep hygiene measures, engage appropriate exposure to sunlight, engage with hobbies in balance with life necessities, and exercise appropriate to their capacity to do so.     Billing: This encounter is of moderate complexity based on number/complexity of problems addressed today and risk of complications/morbidity: 2+ stable chronic illnesses and prescription management.     Parts of this note are electronic transcriptions/translations of spoken language to printed text using the Dragon Dictation system.    Electronically signed by Raul Zambrano MD, 06/13/24, [id number]

## 2024-10-10 ENCOUNTER — TELEPHONE (OUTPATIENT)
Dept: PSYCHIATRY | Facility: CLINIC | Age: 29
End: 2024-10-10

## 2024-10-10 DIAGNOSIS — F41.1 GAD (GENERALIZED ANXIETY DISORDER): ICD-10-CM

## 2024-10-10 RX ORDER — BUSPIRONE HYDROCHLORIDE 10 MG/1
10 TABLET ORAL 2 TIMES DAILY
Qty: 180 TABLET | Refills: 1 | Status: SHIPPED | OUTPATIENT
Start: 2024-10-10

## 2024-10-10 NOTE — TELEPHONE ENCOUNTER
NEXT VISIT WITH PROVIDER   Appointment with Raul Zambrano MD (11/13/2024)     LAST SEEN BY PROVIDER   Office Visit with Raul Zambrano MD (06/13/2024)     LAST MED REFILL  busPIRone (BUSPAR) 10 MG tablet (06/13/2024)     PROVIDER PLEASE ADVISE

## 2024-10-10 NOTE — TELEPHONE ENCOUNTER
Called the number provided and was informed that the patient has to get her medications through the [facility].      Called pt and informed her.  Pt is to call insurance to gain information

## 2024-12-20 ENCOUNTER — OFFICE VISIT (OUTPATIENT)
Dept: PSYCHIATRY | Facility: CLINIC | Age: 29
End: 2024-12-20
Payer: COMMERCIAL

## 2024-12-20 VITALS
BODY MASS INDEX: 25.66 KG/M2 | SYSTOLIC BLOOD PRESSURE: 133 MMHG | HEART RATE: 73 BPM | DIASTOLIC BLOOD PRESSURE: 72 MMHG | WEIGHT: 144.8 LBS | HEIGHT: 63 IN

## 2024-12-20 DIAGNOSIS — F41.1 GAD (GENERALIZED ANXIETY DISORDER): ICD-10-CM

## 2024-12-20 DIAGNOSIS — F32.81 PMDD (PREMENSTRUAL DYSPHORIC DISORDER): ICD-10-CM

## 2024-12-20 DIAGNOSIS — F33.42 DEPRESSION, MAJOR, RECURRENT, IN COMPLETE REMISSION: ICD-10-CM

## 2024-12-20 DIAGNOSIS — F90.9 ATTENTION DEFICIT HYPERACTIVITY DISORDER (ADHD), UNSPECIFIED ADHD TYPE: Primary | ICD-10-CM

## 2024-12-20 RX ORDER — BUPROPION HYDROCHLORIDE 150 MG/1
150 TABLET ORAL EVERY MORNING
Qty: 90 TABLET | Refills: 0 | Status: SHIPPED | OUTPATIENT
Start: 2024-12-20

## 2024-12-20 RX ORDER — DIAZEPAM 10 MG/1
10 TABLET ORAL DAILY PRN
Qty: 10 TABLET | Refills: 1 | Status: SHIPPED | OUTPATIENT
Start: 2024-12-20

## 2024-12-20 RX ORDER — ATOMOXETINE 25 MG/1
25 CAPSULE ORAL EVERY MORNING
Qty: 30 CAPSULE | Refills: 1 | Status: SHIPPED | OUTPATIENT
Start: 2024-12-20

## 2024-12-20 NOTE — TREATMENT PLAN
Multi-Disciplinary Problems (from Behavioral Health Treatment Plan)      Active Problems       Problem:  Patient Care Overview (Adult)  Start Date: 12/20/24      Problem Details: Treatment Planning for Luiza    Applicable diagnoses/problems:    - ADHD: practice behaviors that are conducive to creating functional routines (set a medication schedule, a sleep schedule, an activity schedule, limits on spending and other potential items of impulsivity); work towards optimizing a balance of utilizing compensatory mechanisms (including medications), accepting aid from applicable social communities/family, and validation of variable attention-stimulus traits in order to optimize daily functioning.  - progress: N/A; newly identified issue  - frequency of intervention: as needed  - duration of treatment: until optimized functional status is met    - Depression: enhance motivation and interest with regard to ego-syntonic items of living via routine building and disruption of inhibitory executive cognitive circuits; challenge withdrawal from ego-syntonic items of living; cultivate prisca and satisfaction via a consistent practice of appreciation; consistently practice redirection from intrusive ego-dystonic thoughts towards actionable items to reduce influence these thoughts have in emotions and behavior.  - progress: good, at goal  - frequency of intervention: as needed  - duration of treatment: until optimized functional status is met    - Anxiety: enhance engagement with regard to ego-syntonic items of living via routine building and disruption of inhibitory executive cognitive circuits; challenge avoidance of ego-syntonic items of living via disruption to perceived barriers; reduce salience of fears and overwhelm with regard to their effects on thinking and behavior.  - progress: good, progressing  - frequency of intervention: as needed  - duration of treatment: until optimized functional status is met        Goal Priority  Start Date Expected End Date End Date    Plan of Care Review -- 12/20/24 06/20/25 --

## 2024-12-20 NOTE — PROGRESS NOTES
"[facility]  Follow-up Visit    Chief Complaint: PMDD    History of Present Illness: Luiza Oneill is a 29 y.o. female who presents today for follow-up regarding PMDD and MARA in the setting of PCOS. Last seen: 06/13 at which time no changes were made to her regimen. She presents accompanied by her children and in no acute distress and engages with me appropriately. Psychotropic regimen perceived to be effective. Side-effects per given history: denies.    Current treatment regimen includes:   - bupropion  mg QD  - buspirone 10 mg BID  - diazepam 5 mg QD PRN (#10 monthly for use during premenstrual period)    Today she reports she's been doing well overall. PMDD symptoms are partially managed with current interventions; she notes that she does continue to have intermittent days during which she feels considerably worse and emotionally \"volatile\". She notes that one tablet of diazepam doesn't tend to help when used. She joined PMDD groups on Facebook for some additional support and understanding. She admits she uses THC on some of these days and this can be difficult to rein in; though this does help with anxiety, she notices it does tend to make her feel more down. She notes she generally has trouble focusing, has trouble maintaining a clear and consistent stream of thought, and that stimuli can be very distracting. She notes she always has trouble sitting still. She reports these focus/concentration symptoms are baseline traits of which she's been taking greater inventory in recent time. MARA symptoms are adequately managed with current interventions. Thought process and content are devoid of overt aberration suggestive of acute raul/psychosis. The patient denies SI/HI/AVH. There are no overt changes on exam today compared to most recent evaluation.  - contextual changes: wedding day set for 05/2025), trying on wedding dresses today  - sleep: issues with induction more " recently  - appetite: at baseline (cutting for an upcoming fight) - no change; +3 lb since last visit    With regard to ADHD: I am presumptively treating patient for this issue; history as provided today and presentation today would suggest a distinct possibility this is a contributing factor to patient's dysfunction in her roles and engagements irrespective to screening results. Patient has learned and successfully implemented compensatory coping skills that, with the Bahai of layered stressors throughout adulthood, have begun to fail. Treating ADHD symptoms will likely be a concise and appropriate route to address anxiety and mood issues that are, at least to some degree, secondary to deficits related to traits of ADHD.    I have counseled the patient with regard to diagnoses and the recommended treatment regimen as documented below: I will begin atomoxetine for ADHD and have advised this medication can contribute to issues with BP and HR, appetite suppression, insomnia, and mood changes with worst case scenario being new-onset SI; patient contracts for safety appropriately. Patient acknowledges the diagnoses per my rendered interpretation. Patient demonstrates understanding of potential risks/benefits/side effects associated with this regimen and is amenable to proceed in this fashion.     Psychotherapy  - Time: 23 minutes  - interventions employed: the therapeutic alliance was strengthened to encourage the patient to express their thoughts and feelings freely. Esteem building was enhanced through praise, reassurance, normalizing/challenging, and encouragement as appropriate. Coping skills were enhanced to build distress tolerance skills and emotional regulation. Allowed patient to freely discuss issues without interruption or judgement with unconditional positive regard, active listening skills, and empathy. Provided a safe, confidential environment to facilitate the development of a positive therapeutic  "relationship and encourage open, honest communication. Assisted patient in processing session content; acknowledged and normalized/addressed, as appropriate, patient’s thoughts, feelings, and concerns by utilizing a person-centered approach in efforts to build appropriate rapport and a positive therapeutic relationship with open and honest communication.   - Diagnoses: see assessment and plan below  - Symptoms: see subjective above  - Goals   - patient: mitigate anxiety and irritability, sustain improvements to mood   - provider: challenge patterns of living conducive to pathology, strengthen defenses, promote problems solving, restore adaptive functioning and provide symptom relief.  - Treatment plan: continue supportive psychotherapy in subsequent appointments to provide symptom relief; see assessment and plan below for additional details:   - iteration: 1   - progress: partial   - (X)illumination, (X)contextualization, (X)detection, (working)development, (-)elaboration, (-)refinement  - functional status: fair  - mental status exam: as below  - prognosis: good    Psychiatric History:  - Psychotropic medication trials: sertraline after her last pregnancy (helped with some anxiety, but felt estranged from herself, disconnected, had significant sexual SE)  - Key synopsis: cutting as a teen, short-lived - \"more for attention\"; no hx SA    Substance Abuse History:   - Key synopsis: opioids \"were a phase around 17\", quit smoking marijuana in 2022    Social History:  - Key synopsis: lives in a house with her fiance and 4 children (1.6 YO, 3 YO, 6 YO, 5 YO); LPN; 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[employer]; [employer]ai, occasionally goes to Religious; HIIT, yoga; recently decreased (was drinking a pot of coffee in the past)    Social History     Socioeconomic History    Marital status: Single   Tobacco Use    Smoking status: Former     Current packs/day: 0.00     Average packs/day: 1 pack/day for 12.0 years (12.0 ttl pk-yrs)     Types: Cigarettes     " Start date: 2010     Quit date: 2022     Years since quittin.5    Smokeless tobacco: Never    Tobacco comments:     Quit cold turkey 6 months ago   Vaping Use    Vaping status: Former    Substances: Nicotine, THC, Flavoring   Substance and Sexual Activity    Alcohol use: Not Currently     Comment: socailly    Drug use: Not Currently     Types: Marijuana, Oxycodone     Comment: RECENT DAILY USE AS OF 2024; PT STOPPED 7 DAYS AGO AS OF THIS DAY OF THE MARIJUANA. On and off for years. Quit 6 months ago    Sexual activity: Yes     Partners: Male     Birth control/protection: None, Tubal ligation     Tobacco use counseling/intervention: N/A, patient does not use tobacco; patient has been counseled with regard to risks of tobacco use.    PHQ-9 Depression Screening  PHQ-9 Total Score:      Little interest or pleasure in doing things?     Feeling down, depressed, or hopeless?     Trouble falling or staying asleep, or sleeping too much?     Feeling tired or having little energy?     Poor appetite or overeating?     Feeling bad about yourself - or that you are a failure or have let yourself or your family down?     Trouble concentrating on things, such as reading the newspaper or watching television?     Moving or speaking so slowly that other people could have noticed? Or the opposite - being so fidgety or restless that you have been moving around a lot more than usual?     Thoughts that you would be better off dead, or of hurting yourself in some way?     PHQ-9 Total Score       Change in PHQ-9 since last measure: N/A (5)    MARA-7       Change in MARA-7 since last measure: N/A (4)    ASRS-v1.1  Part A    Part B      Total      Problem List:  Patient Active Problem List   Diagnosis    Depression affecting pregnancy    Hx of preeclampsia, prior pregnancy, currently pregnant    Previous  delivery affecting pregnancy    Rh negative status during pregnancy    PCOS (polycystic ovarian syndrome)     MARA (generalized anxiety disorder)    PMDD (premenstrual dysphoric disorder)    Depression, major, recurrent, in complete remission    Attention deficit hyperactivity disorder (ADHD)     Allergy:   Allergies   Allergen Reactions    Sulfa Antibiotics Other (See Comments)    Sulfacetamide Sodium-Sulfur Hives      Discontinued Medications:  Medications Discontinued During This Encounter   Medication Reason    buPROPion XL (WELLBUTRIN XL) 150 MG 24 hr tablet Reorder    diazePAM (VALIUM) 5 MG tablet Reorder       Current Medications:   Current Outpatient Medications   Medication Sig Dispense Refill    buPROPion XL (WELLBUTRIN XL) 150 MG 24 hr tablet Take 1 tablet by mouth Every Morning. 90 tablet 0    busPIRone (BUSPAR) 10 MG tablet TAKE 1 TABLET BY MOUTH TWICE DAILY 180 tablet 1    diazePAM (VALIUM) 10 MG tablet Take 1 tablet by mouth Daily As Needed for Anxiety. take during premenstrual period only 10 tablet 1    multivitamin with minerals tablet tablet Take 1 tablet by mouth Daily.      Probiotic Product (ACIDOPHILUS PROBIOTIC BLEND PO) Acidophilus Probiotic Blend      spironolactone (ALDACTONE) 50 MG tablet Take 1 tablet by mouth Daily.      atomoxetine (Strattera) 25 MG capsule Take 1 capsule by mouth Every Morning. 30 capsule 1     No current facility-administered medications for this visit.     Past Medical History:  Past Medical History:   Diagnosis Date    Anxiety     Attention deficit hyperactivity disorder (ADHD) 12/20/2024    Depression     Depression affecting pregnancy 1/7/2021    Panic disorder     Self-injurious behavior     As a teenager    Substance abuse      Past Surgical History:  Past Surgical History:   Procedure Laterality Date    ABDOMINAL SURGERY      4x c sections    POSTPARTUM TUBAL LIGATION       Mental Status Exam:   Appearance: well-groomed, sits upright, age-appropriate, normal habitus  Behavior: calm, cooperative, appropriate in demeanor, appropriate eye-contact  Mood/affect: euthymic  "/ mood-congruent and appropriate in both range and amplitude  Speech: within expected variance; appropriate rate, appropriate rhythm, appropriate tone; non-pressured  Thought Process: linear, goal-directed; no FOI or ASTER; abstraction intact  Thought Content: coherent, devoid of overt delusions/perceptual disturbances  SI/HI: denies both SI and HI; exhibits future-orientation, self-advocates appropriately, no regular self-harm, no appreciable intent  Memory: no overt deficits  Orientation: oriented to person/place/time/situation  Concentration: appropriate during interview; +subjective deficits  Intellectual capacity: presumptively average  Insight: fair by given history/exam  Judgment: appropriate by given history/exam  Psychomotor: no appreciable latency/retardation/agitation/tremor  Gait: WNL    Review of Systems:  Review of Systems   Constitutional:  Negative for activity change, appetite change and unexpected weight change.   Gastrointestinal:  Negative for abdominal pain and nausea.   Psychiatric/Behavioral:  Positive for agitation. Negative for sleep disturbance.      Vital Signs:   /72   Pulse 73   Ht 160 cm (63\")   Wt 65.7 kg (144 lb 12.8 oz)   BMI 25.65 kg/m²      Lab Results:   No visits with results within 6 Month(s) from this visit.   Latest known visit with results is:   No results found for any previous visit.     EKG Results:  No orders to display     Imaging Results:  No Images in the past 120 days found.    ASSESSMENT AND PLAN:    ICD-10-CM ICD-9-CM   1. Attention deficit hyperactivity disorder (ADHD), unspecified ADHD type  F90.9 314.01   2. PMDD (premenstrual dysphoric disorder)  F32.81 625.4   3. MARA (generalized anxiety disorder)  F41.1 300.02   4. Depression, major, recurrent, in complete remission  F33.42 296.36     29 y.o. female who presents today for follow-up regarding PMDD, MARA in the context of PCOS. We have discussed the interval history and the treatment plan below, including " potential R/B/SE of the recommended regimen of which the patient demonstrates understanding. Patient is agreeable to call 911 or go to the nearest ER should she become concerned for her own safety and/or the safety of those around her. There are no medication side effects or related complaints today. There are no overt indices of acute raul/psychosis on exam today.     Medication regimen: titrate diazepam to 10 mg QD PRN agitation (#10) and begin atomoxetine 25 mg QAM - continue buspirone, bupropion; patient is advised not to misuse prescribed medications or to use them with any exogenous substances that aren't disclosed to this provider as they may interact with the regimen to the patient's detriment.   Risk assessment: protracted risk is low, imminent risk is low - no interval change. Do note that this is subject to change with the Restorationism of new stressors, treatment non-adherence, use of substances, and/or new medical ails.   Monitoring: ASRS today 18/18  Therapy: defer  Follow-up: 2 months  Communications: N/A  Treatment plan: due    TREATMENT PLAN/GOALS: challenge patterns of living conducive to symptom burden, implement recommended regimen as above with augmentative, intermittent supportive psychotherapy to reduce symptom burden. Patient acknowledged and verbally consented to continue treatment. The importance of adherence to the recommended treatment and interval follow-up appointments was again emphasized today: patient has good treatment adherence per given history. Patient was today reminded to limit daily caffeine intake, hydrate appropriately, eat healthy and nutritious foods, engage sleep hygiene measures, engage appropriate exposure to sunlight, engage with hobbies in balance with life necessities, and exercise appropriate to their capacity to do so.     Billing: This encounter is of moderate complexity based on number/complexity of problems addressed today and risk of complications/morbidity: 2+  stable chronic illnesses and prescription management. Additionally, I provided 23 minutes of dedicated psychotherapy to the patient, distinct from E/M services, as documented above. Start time: 0909. Stop time: 0932.     Parts of this note are electronic transcriptions/translations of spoken language to printed text using the Dragon Dictation system.    Electronically signed by Raul Zambrano MD, 12/20/24, [id number]

## 2024-12-26 ENCOUNTER — TELEPHONE (OUTPATIENT)
Dept: PSYCHIATRY | Facility: CLINIC | Age: 29
End: 2024-12-26
Payer: COMMERCIAL

## 2024-12-26 NOTE — TELEPHONE ENCOUNTER
PT PHONED IN.    SHE STARTED THE ATOMOXETINE.    IT HAS MADE HER SO TIRED.     SHE CAN'T NOT STAY AWAKE.    SHE WASN'T SURE IF THIS IS NORMAL AT FIRST AND SHE SHOULD KEEP TAKING IT AND GIVE IT MORE TIME.    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    SHE STARTED IT ON THE MORNING OF THE [address] AND THE FATIGUE HAS BEEN LIKE THIS DAILY.    COVERING PROVIDER PLEASE ADVISE.

## 2025-02-08 DIAGNOSIS — F41.1 GAD (GENERALIZED ANXIETY DISORDER): Primary | ICD-10-CM

## 2025-02-08 DIAGNOSIS — F32.81 PMDD (PREMENSTRUAL DYSPHORIC DISORDER): ICD-10-CM

## 2025-02-10 RX ORDER — DIAZEPAM 10 MG/1
TABLET ORAL
Qty: 10 TABLET | Refills: 1 | Status: SHIPPED | OUTPATIENT
Start: 2025-02-10

## 2025-02-10 NOTE — TELEPHONE ENCOUNTER
CONTROLLED MEDICATION REFILL REQUEST    STATE REGULATION APPT EVERY 3 MONTHS     UDS(URINE DRUG SCREEN) EVERY 6 MONTHS OR UP TO PROVIDER PREFERENCE   (TJ CORLEY 1 PER YEAR)     NEW NARC CONSENT EVERY YEAR      MEDICATION: diazePAM (VALIUM) 10 MG tablet (12/20/2024)      NEXT OFFICE VISIT: Appointment with Raul Zambrano MD (02/20/2025)     LAST OFFICE VISIT: Office Visit with Raul Zambrano MD (12/20/2024)     NARC CONSENT: NONE IN EPIC     URINE DRUG SCREEN(STANDING ORDER)   (TJ CORLEY 1 PER YEAR): UDS(URINE DRUG SCREEN) PENDED FOR PROVIDER REVIEW     PROVIDER PLEASE ADVISE

## 2025-03-27 ENCOUNTER — OFFICE VISIT (OUTPATIENT)
Dept: PSYCHIATRY | Facility: CLINIC | Age: 30
End: 2025-03-27
Payer: COMMERCIAL

## 2025-03-27 VITALS
DIASTOLIC BLOOD PRESSURE: 78 MMHG | HEART RATE: 67 BPM | HEIGHT: 63 IN | BODY MASS INDEX: 26.05 KG/M2 | WEIGHT: 147 LBS | SYSTOLIC BLOOD PRESSURE: 129 MMHG

## 2025-03-27 DIAGNOSIS — F41.1 GAD (GENERALIZED ANXIETY DISORDER): ICD-10-CM

## 2025-03-27 DIAGNOSIS — F90.9 ATTENTION DEFICIT HYPERACTIVITY DISORDER (ADHD), UNSPECIFIED ADHD TYPE: ICD-10-CM

## 2025-03-27 DIAGNOSIS — F33.42 DEPRESSION, MAJOR, RECURRENT, IN COMPLETE REMISSION: ICD-10-CM

## 2025-03-27 DIAGNOSIS — F32.81 PMDD (PREMENSTRUAL DYSPHORIC DISORDER): Primary | ICD-10-CM

## 2025-03-27 RX ORDER — BUPROPION HYDROCHLORIDE 150 MG/1
150 TABLET ORAL EVERY MORNING
Qty: 90 TABLET | Refills: 0 | Status: SHIPPED | OUTPATIENT
Start: 2025-03-27

## 2025-03-27 RX ORDER — DIAZEPAM 10 MG/1
10 TABLET ORAL DAILY PRN
Qty: 10 TABLET | Refills: 2 | Status: SHIPPED | OUTPATIENT
Start: 2025-03-27

## 2025-03-27 RX ORDER — BUSPIRONE HYDROCHLORIDE 10 MG/1
10 TABLET ORAL 2 TIMES DAILY
Qty: 180 TABLET | Refills: 0 | Status: SHIPPED | OUTPATIENT
Start: 2025-03-27
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
## 2025-04-17 DIAGNOSIS — F32.81 PMDD (PREMENSTRUAL DYSPHORIC DISORDER): ICD-10-CM

## 2025-04-17 RX ORDER — BUPROPION HYDROCHLORIDE 150 MG/1
150 TABLET ORAL EVERY MORNING
Qty: 90 TABLET | Refills: 0 | Status: SHIPPED | OUTPATIENT
Start: 2025-04-17

## 2025-04-17 NOTE — TELEPHONE ENCOUNTER
REFILL REQUEST:     buPROPion XL (WELLBUTRIN XL) 150 MG 24 hr tablet (03/27/2025)    -PT SHOULD HAVE REFILLS AT HER PHARMACY AS A 90 DAY PRESCRIPTION WAS SENT IN ON 03/27/2025 AND ONLY 30 TABLETS HAVE BEEN DISPENSED TO HER SO FAR.     F/UP- 06/26/2025.  LOV: 03/27/2025.

## 2025-05-19 ENCOUNTER — TELEPHONE (OUTPATIENT)
Dept: PSYCHIATRY | Facility: CLINIC | Age: 30
End: 2025-05-19
Payer: COMMERCIAL

## 2025-05-19 DIAGNOSIS — F90.9 ATTENTION DEFICIT HYPERACTIVITY DISORDER (ADHD), UNSPECIFIED ADHD TYPE: Primary | ICD-10-CM

## 2025-05-19 NOTE — TELEPHONE ENCOUNTER
Acknowledged. I know we've discussed options and R/B of the medications - is she wanting to try something like Adderall or does she prefer to try a different non-controlled substance?

## 2025-05-19 NOTE — TELEPHONE ENCOUNTER
Pt says that the provider told her to call and let us know when she was ready to try a different medication for her ADHD .  PT says that she thinks that she is ready to try something now and says that if she starts now she can report back at her appt

## 2025-05-21 RX ORDER — DEXTROAMPHETAMINE SACCHARATE, AMPHETAMINE ASPARTATE MONOHYDRATE, DEXTROAMPHETAMINE SULFATE AND AMPHETAMINE SULFATE 2.5; 2.5; 2.5; 2.5 MG/1; MG/1; MG/1; MG/1
10 CAPSULE, EXTENDED RELEASE ORAL EVERY MORNING
Qty: 30 CAPSULE | Refills: 0 | Status: SHIPPED | OUTPATIENT
Start: 2025-05-21

## 2025-06-18 DIAGNOSIS — F90.9 ATTENTION DEFICIT HYPERACTIVITY DISORDER (ADHD), UNSPECIFIED ADHD TYPE: ICD-10-CM

## 2025-06-18 NOTE — TELEPHONE ENCOUNTER
Appointment with Raul Zambrano MD (06/26/2025)     Urine Drug Screen - Urine, Clean Catch (02/10/2025 09:16)   Informed pt that she needed to get this. She said she can go get it tomorrow.    No CSA in chart.  Reminder added to next appt.    Patient needs a refill.  Order pended  Pending to follow up regarding uds tomorrow

## 2025-06-19 ENCOUNTER — LAB (OUTPATIENT)
Dept: LAB | Facility: HOSPITAL | Age: 30
End: 2025-06-19
Payer: COMMERCIAL

## 2025-06-19 DIAGNOSIS — F41.1 GAD (GENERALIZED ANXIETY DISORDER): ICD-10-CM

## 2025-06-19 LAB
AMPHET+METHAMPHET UR QL: POSITIVE
AMPHETAMINES UR QL: NEGATIVE
BARBITURATES UR QL SCN: NEGATIVE
BENZODIAZ UR QL SCN: NEGATIVE
BUPRENORPHINE SERPL-MCNC: NEGATIVE NG/ML
CANNABINOIDS SERPL QL: POSITIVE
COCAINE UR QL: NEGATIVE
FENTANYL UR-MCNC: NEGATIVE NG/ML
METHADONE UR QL SCN: NEGATIVE
OPIATES UR QL: NEGATIVE
OXYCODONE UR QL SCN: NEGATIVE
PCP UR QL SCN: NEGATIVE
TRICYCLICS UR QL SCN: NEGATIVE

## 2025-06-19 PROCEDURE — 80307 DRUG TEST PRSMV CHEM ANLYZR: CPT

## 2025-06-19 NOTE — TELEPHONE ENCOUNTER
ATTEMPTED TO CONTACT PT(PATIENT)      NO ANSWER      LEFT VOICEMAIL WITH INSTRUCTIONS TO RETURN CALL TO OFFICE AT [phone]

## 2025-06-20 RX ORDER — DEXTROAMPHETAMINE SACCHARATE, AMPHETAMINE ASPARTATE MONOHYDRATE, DEXTROAMPHETAMINE SULFATE AND AMPHETAMINE SULFATE 2.5; 2.5; 2.5; 2.5 MG/1; MG/1; MG/1; MG/1
10 CAPSULE, EXTENDED RELEASE ORAL EVERY MORNING
Qty: 30 CAPSULE | Refills: 0 | Status: SHIPPED | OUTPATIENT
Start: 2025-06-22

## 2025-06-20 NOTE — TELEPHONE ENCOUNTER
PT(PATIENT) VERIFIED     PT(PATIENT) COMPLETED ORDERS   Urine Drug Screen - Urine, Clean Catch (2025 12:01)  Fentanyl, Urine - Urine, Clean Catch (2025 12:01)

## 2025-06-22 DIAGNOSIS — F32.81 PMDD (PREMENSTRUAL DYSPHORIC DISORDER): ICD-10-CM

## 2025-06-23 RX ORDER — BUPROPION HYDROCHLORIDE 150 MG/1
150 TABLET ORAL EVERY MORNING
Qty: 90 TABLET | Refills: 0 | Status: SHIPPED | OUTPATIENT
Start: 2025-06-23

## 2025-06-23 NOTE — TELEPHONE ENCOUNTER
NEXT VISIT WITH PROVIDER   Appointment with Raul Zambrano MD (06/26/2025)     LAST SEEN BY PROVIDER   Office Visit with Raul Zambrano MD (03/27/2025)     LAST MED REFILL  buPROPion XL (WELLBUTRIN XL) 150 MG 24 hr tablet (04/17/2025)     PROVIDER PLEASE ADVISE

## 2025-06-24 ENCOUNTER — TELEPHONE (OUTPATIENT)
Dept: PSYCHIATRY | Facility: CLINIC | Age: 30
End: 2025-06-24
Payer: COMMERCIAL

## 2025-06-24 DIAGNOSIS — F90.9 ATTENTION DEFICIT HYPERACTIVITY DISORDER (ADHD), UNSPECIFIED ADHD TYPE: ICD-10-CM

## 2025-06-24 RX ORDER — DEXTROAMPHETAMINE SACCHARATE, AMPHETAMINE ASPARTATE MONOHYDRATE, DEXTROAMPHETAMINE SULFATE AND AMPHETAMINE SULFATE 2.5; 2.5; 2.5; 2.5 MG/1; MG/1; MG/1; MG/1
10 CAPSULE, EXTENDED RELEASE ORAL EVERY MORNING
Qty: 30 CAPSULE | Refills: 0 | Status: SHIPPED | OUTPATIENT
Start: 2025-06-24 | End: 2025-06-26 | Stop reason: SDUPTHER
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
## 2025-06-26 ENCOUNTER — OFFICE VISIT (OUTPATIENT)
Dept: PSYCHIATRY | Facility: CLINIC | Age: 30
End: 2025-06-26
Payer: COMMERCIAL

## 2025-06-26 VITALS
DIASTOLIC BLOOD PRESSURE: 68 MMHG | HEART RATE: 82 BPM | BODY MASS INDEX: 26.22 KG/M2 | SYSTOLIC BLOOD PRESSURE: 128 MMHG | HEIGHT: 63 IN | WEIGHT: 148 LBS

## 2025-06-26 DIAGNOSIS — F33.42 DEPRESSION, MAJOR, RECURRENT, IN COMPLETE REMISSION: ICD-10-CM

## 2025-06-26 DIAGNOSIS — F90.9 ATTENTION DEFICIT HYPERACTIVITY DISORDER (ADHD), UNSPECIFIED ADHD TYPE: Primary | ICD-10-CM

## 2025-06-26 DIAGNOSIS — F41.1 GAD (GENERALIZED ANXIETY DISORDER): ICD-10-CM

## 2025-06-26 RX ORDER — ATOMOXETINE 25 MG/1
CAPSULE ORAL
COMMUNITY
End: 2025-06-26

## 2025-06-26 RX ORDER — CEPHALEXIN 500 MG/1
CAPSULE ORAL
COMMUNITY
End: 2025-06-26

## 2025-06-26 RX ORDER — DEXTROAMPHETAMINE SACCHARATE, AMPHETAMINE ASPARTATE MONOHYDRATE, DEXTROAMPHETAMINE SULFATE AND AMPHETAMINE SULFATE 2.5; 2.5; 2.5; 2.5 MG/1; MG/1; MG/1; MG/1
10 CAPSULE, EXTENDED RELEASE ORAL EVERY MORNING
Qty: 30 CAPSULE | Refills: 0 | Status: SHIPPED | OUTPATIENT
Start: 2025-07-24

## 2025-06-26 NOTE — PROGRESS NOTES
[facility]  Follow-up Visit    Chief Complaint: depressive, anxious, and attention symptoms    History of Present Illness: Luiza Oneill is a 29 y.o. female who presents today for follow-up. Last seen by this practice: 03/27 at which time no changes were made to her regimen. Amphetamine has since been added.    Current treatment regimen includes:   - bupropion  mg QD  - buspirone 10 mg BID  - diazepam 10 mg QD PRN (#10 monthly for use during premenstrual period)  - amphetamine XR 10 mg QAM    Luiza presents on time and accompanied by her sons in no acute distress and engages with me appropriately. Today she reports she's doing well - she's seen improved focus, mitigated anxiety, mitigated impulsivity with the Buddhism of amphetamine. Depressive, ADHD, PMDD, and anxious symptoms are manageable with current interventions. She feels her regimen conveys sufficient benefit.  - sleep: generally adequate, no change  - appetite: slightly diminished; weight stable  - medication adverse effects: diminished appetite, sensitivity to caffeine with amphetamine; diazepam does seem to cause irritability/agitation as its effects wane    Interval History and Clinical Commentary:   Ego-syntonic. She presents in a fashion consistent with the spectrum of prior assessments with regard to MSE.    She was  (05/02/2025) on her sister's property. There was a large tent and a bonfire and it went well.    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 She is excited for this.    Axis I: PMDD, MDD, MARA, ADHD  Axis II: defer  Axis III: defer  Axis IV: defer  Axis V: 75    Differential considerations: N/A.    Adherence:  Treatment adherence is partial; issues in this regard have included: missed appointments. Patient is advised not to misuse prescribed medications or to use them with any exogenous substances that aren't disclosed to this provider as they may  interact with the regimen to the patient's detriment. The importance of adherence to the recommended treatment and interval follow-up appointments has been emphasized.     Education:  I have counseled Luiza with regard to diagnoses and the recommended treatment regimen as documented below. I have advised that because medications can elicit idiosyncratic reactions in different individuals that SE may present in ways that haven't been discussed. I have reiterated the importance of discussing with me any clinical changes that could represent potential adverse effects regarding the medication regimen.    Patient acknowledges the diagnoses per my rendered interpretation. Patient is agreeable to call 911 or go to the nearest ER should she become concerned for her own safety and/or the safety of those around her. Patient demonstrates understanding of potential risks/benefits/side effects associated with the recommended treatment regimen and is amenable to proceed in the fashion outlined below. Patient has been encouraged to cultivate constructive patterns of living including limiting daily caffeine intake, hydrating appropriately, regularly eating nutritious foods, engaging sleep hygiene practices, engaging in appropriate exposure to sunlight, engaging with hobbies in balance with life necessities, and exercising appropriate to their capacity to do so.    Risk:  There is no significant change to risk profile discernible during today's evaluation - do note that this is subject to change with the Anab[employer]ist of new stressors, treatment non-adherence, use of substances, and/or new medical ails. There is no appreciable evidence of intent for harm to self or others. There are no appreciable indices of raul/psychosis.    Contraception and mitigation of potential teratogenicity: I have advised there are risks taking any medication during pregnancy and, unfortunately, these risks are poorly elaborated due to ethical concerns with  studying medications in pregnant women. I have advised that in the case of pregnancy risk may be beyond what I'm able to advise and the general approach is that medication use should only be considered if potential benefits outweigh the possibility of risks by the patient's measure.    Psychotherapy:  - Time: N/A  - interventions employed: the therapeutic alliance was strengthened to encourage the patient to express their thoughts and feelings freely. Esteem building was enhanced through praise, reassurance, normalizing/challenging, and encouragement as appropriate. Coping skills were enhanced to build distress tolerance skills and emotional regulation. Allowed patient to freely discuss issues without interruption or judgement with unconditional positive regard, active listening skills, and empathy. Provided a safe, confidential environment to facilitate the development of a positive therapeutic relationship and encourage open, honest communication. Assisted patient in processing session content; acknowledged and normalized/addressed, as appropriate, patient’s thoughts, feelings, and concerns by utilizing a person-centered approach in efforts to build appropriate rapport and a positive therapeutic relationship with open and honest communication.   - Diagnoses: see assessment and plan below  - Symptoms: see subjective above  - Goals              - patient: mitigate anxiety and irritability, sustain improvements to mood              - provider: challenge patterns of living conducive to pathology, strengthen defenses, promote problems solving, restore adaptive functioning and provide symptom relief.  - Treatment plan: continue supportive psychotherapy in subsequent appointments to provide symptom relief; see assessment and plan below for additional details:              - iteration: 1              - progress: at functional goals              - (X)illumination, (X)contextualization, (X)detection, (X)development,  "(X)elaboration, (working)refinement  - functional status: fair  - mental status exam: as below  - prognosis: good     Psychiatric History:  - Psychotropic medication trials: sertraline after her last pregnancy (helped with some anxiety, but felt estranged from herself, disconnected, had significant sexual SE)  - Key synopsis: cutting as a teen, short-lived - \"more for attention\"; no hx SA     Substance Abuse History:   - Key synopsis: opioids \"were a phase around 17\", quit smoking marijuana in 2022     Social History:  - Key synopsis: lives in a house with her fiance and 4 children (1.4 YO, 3 YO, 4 YO, 5 YO); LPN; 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[employer]; Muslim, occasionally goes to [employer]; HIIT, yoga; recently decreased (was drinking a pot of coffee in the past)    Social History     Socioeconomic History    Marital status: Single   Tobacco Use    Smoking status: Former     Current packs/day: 0.00     Average packs/day: 1 pack/day for 12.0 years (12.0 ttl pk-yrs)     Types: Cigarettes     Start date: 06/2010     Quit date: 06/2022     Years since quitting: 3.0    Smokeless tobacco: Never    Tobacco comments:     Quit cold turkey 6 months ago   Vaping Use    Vaping status: Former    Substances: Nicotine, THC, Flavoring   Substance and Sexual Activity    Alcohol use: Not Currently     Comment: socailly    Drug use: Not Currently     Types: Marijuana, Oxycodone     Comment: RECENT DAILY USE AS OF 12/20/2024; PT STOPPED 7 DAYS AGO AS OF THIS DAY OF THE MARIJUANA. On and off for years. Quit 6 months ago    Sexual activity: Yes     Partners: Male     Birth control/protection: None, Tubal ligation     Tobacco use counseling/intervention: N/A, patient does not use tobacco; patient has been counseled with regard to risks of tobacco use.    PHQ-9 Depression Screening  PHQ-9 Total Score:       Little interest or pleasure in doing things?     Feeling down, depressed, or hopeless?     PHQ-2 Total Score     Trouble falling or staying asleep, or " sleeping too much?     Feeling tired or having little energy?     Poor appetite or overeating?     Feeling bad about yourself - or that you are a failure or have let yourself or your family down?     Trouble concentrating on things, such as reading the newspaper or watching television?     Moving or speaking so slowly that other people could have noticed? Or the opposite - being so fidgety or restless that you have been moving around a lot more than usual?     Thoughts that you would be better off dead, or of hurting yourself in some way?     PHQ-9 Total Score     If you checked off any problems, how difficult have these problems made it for you to do your work, take care of things at home, or get along with other people?       Change in PHQ-9 since last measure: N/A (16)    MARA-7       Change in MARA-7 since last measure: N/A (14)    Problem List:  Patient Active Problem List   Diagnosis    Depression affecting pregnancy    Hx of preeclampsia, prior pregnancy, currently pregnant    Previous  delivery affecting pregnancy    Rh negative status during pregnancy    PCOS (polycystic ovarian syndrome)    MARA (generalized anxiety disorder)    PMDD (premenstrual dysphoric disorder)    Depression, major, recurrent, in complete remission    Attention deficit hyperactivity disorder (ADHD)     Allergy:   Allergies   Allergen Reactions    Sulfa Antibiotics Other (See Comments)    Sulfacetamide Sodium-Sulfur Hives      Discontinued Medications:  Medications Discontinued During This Encounter   Medication Reason    atomoxetine (STRATTERA) 25 MG capsule     cephalexin (KEFLEX) 500 MG capsule      Current Medications:   Current Outpatient Medications   Medication Sig Dispense Refill    amphetamine-dextroamphetamine XR (Adderall XR) 10 MG 24 hr capsule Take 1 capsule by mouth Every Morning 30 capsule 0    buPROPion XL (WELLBUTRIN XL) 150 MG 24 hr tablet TAKE 1 TABLET BY MOUTH EVERY MORNING 90 tablet 0    busPIRone (BUSPAR) 10  MG tablet Take 1 tablet by mouth 2 (Two) Times a Day. 180 tablet 0    diazePAM (VALIUM) 10 MG tablet Take 1 tablet by mouth Daily As Needed for Anxiety. 10 tablet 2    multivitamin with minerals tablet tablet Take 1 tablet by mouth Daily.      Probiotic Product (ACIDOPHILUS PROBIOTIC BLEND PO) Acidophilus Probiotic Blend      spironolactone (ALDACTONE) 50 MG tablet Take 1 tablet by mouth Daily.       No current facility-administered medications for this visit.     Past Medical History:  Past Medical History:   Diagnosis Date    Anxiety     Attention deficit hyperactivity disorder (ADHD) 12/20/2024    Depression     Depression affecting pregnancy 1/7/2021    Panic disorder     Self-injurious behavior     As a teenager    Substance abuse      Past Surgical History:  Past Surgical History:   Procedure Laterality Date    ABDOMINAL SURGERY      4x c sections    POSTPARTUM TUBAL LIGATION       Mental Status Exam:   Appearance: well-groomed, sits upright, age-appropriate, fit habitus  Behavior: calm, cooperative, appropriate in demeanor, appropriate eye-contact  Mood/affect: euthymic / mood-congruent and appropriate in both range and amplitude  Speech: within expected variance; appropriate rate, appropriate rhythm, appropriate tone; non-pressured  Thought Process: linear, goal-directed; no FOI or ASTER; abstraction intact  Thought Content: coherent, devoid of overt delusions/perceptual disturbances  SI/HI: denies both SI and HI; exhibits future-orientation, self-advocates appropriately, no regular self-harm, no appreciable intent  Memory: no overt deficits  Orientation: oriented to person/place/time/situation  Concentration: appropriate during interview  Intellectual capacity: presumptively average  Insight: fair by given history/exam  Judgment: appropriate by given history/exam  Psychomotor: no appreciable latency/retardation/agitation/tremor  Gait: WNL    Review of Systems:  Review of Systems   Constitutional:  Negative  "for activity change, appetite change and unexpected weight change.   HENT:  Negative for drooling.    Eyes:  Negative for visual disturbance.   Respiratory:  Negative for chest tightness and shortness of breath.    Cardiovascular:  Negative for chest pain and palpitations.   Gastrointestinal:  Negative for abdominal pain, diarrhea and nausea.   Endocrine: Negative for cold intolerance and heat intolerance.   Genitourinary:  Negative for difficulty urinating and frequency.   Musculoskeletal:  Negative for myalgias and neck stiffness.   Skin:  Negative for rash.   Neurological:  Negative for dizziness, tremors, seizures and light-headedness.   Psychiatric/Behavioral:  Negative for agitation and sleep disturbance.      Vital Signs:   /68   Pulse 82   Ht 160 cm (63\")   Wt 67.1 kg (148 lb)   BMI 26.22 kg/m²      Lab Results:   Lab on 06/19/2025   Component Date Value Ref Range Status    THC, Screen, Urine 06/19/2025 Positive (A)  Negative Final    Phencyclidine (PCP), Urine 06/19/2025 Negative  Negative Final    Cocaine Screen, Urine 06/19/2025 Negative  Negative Final    Methamphetamine, Ur 06/19/2025 Negative  Negative Final    Opiate Screen 06/19/2025 Negative  Negative Final    Amphetamine Screen, Urine 06/19/2025 Positive (A)  Negative Final    Benzodiazepine Screen, Urine 06/19/2025 Negative  Negative Final    Tricyclic Antidepressants Screen 06/19/2025 Negative  Negative Final    Methadone Screen, Urine 06/19/2025 Negative  Negative Final    Barbiturates Screen, Urine 06/19/2025 Negative  Negative Final    Oxycodone Screen, Urine 06/19/2025 Negative  Negative Final    Buprenorphine, Screen, Urine 06/19/2025 Negative  Negative Final    Fentanyl, Urine 06/19/2025 Negative  Negative Final     EKG Results:  No orders to display     Imaging Results:  No Images in the past 120 days found.    ASSESSMENT AND PLAN:    ICD-10-CM ICD-9-CM   1. Attention deficit hyperactivity disorder (ADHD), unspecified ADHD type  " F90.9 314.01   2. MARA (generalized anxiety disorder)  F41.1 300.02   3. Depression, major, recurrent, in complete remission  F33.42 296.36     29 y.o. female who presents today for follow-up. We have discussed the interval history and the treatment plan below:      Medication regimen: no change - continue bupropion, diazepam, buspirone, amphetamine XR  Monitoring: N/A  Primary psychotherapy: deferred  Follow-up: 3 months  Communications: N/A  Treatment plan: due; defer (at goal)    TREATMENT PLAN/GOALS: challenge patterns of living conducive to symptom burden, implement recommended regimen as above with augmentative, intermittent supportive psychotherapy to reduce symptom burden. Patient acknowledged and verbally consented to continue treatment.      Billing: This encounter is of moderate complexity based on number/complexity of problems addressed today and risk of complications/morbidity: 2+ stable chronic illnesses and and prescription management.     Electronically signed by Raul Zambrano MD, 06/26/25, [id number]